# Patient Record
Sex: FEMALE
[De-identification: names, ages, dates, MRNs, and addresses within clinical notes are randomized per-mention and may not be internally consistent; named-entity substitution may affect disease eponyms.]

---

## 2021-04-21 ENCOUNTER — HOSPITAL ENCOUNTER (INPATIENT)
Dept: HOSPITAL 5 - ED | Age: 76
LOS: 5 days | Discharge: ANOTHER HOSPITAL | DRG: 375 | End: 2021-04-26
Attending: INTERNAL MEDICINE | Admitting: INTERNAL MEDICINE
Payer: MEDICARE

## 2021-04-21 DIAGNOSIS — I10: ICD-10-CM

## 2021-04-21 DIAGNOSIS — C79.00: ICD-10-CM

## 2021-04-21 DIAGNOSIS — Z79.01: ICD-10-CM

## 2021-04-21 DIAGNOSIS — Z90.49: ICD-10-CM

## 2021-04-21 DIAGNOSIS — Z82.49: ICD-10-CM

## 2021-04-21 DIAGNOSIS — F17.210: ICD-10-CM

## 2021-04-21 DIAGNOSIS — K29.00: ICD-10-CM

## 2021-04-21 DIAGNOSIS — Z79.891: ICD-10-CM

## 2021-04-21 DIAGNOSIS — K63.5: ICD-10-CM

## 2021-04-21 DIAGNOSIS — C18.4: Primary | ICD-10-CM

## 2021-04-21 DIAGNOSIS — E87.1: ICD-10-CM

## 2021-04-21 DIAGNOSIS — D75.1: ICD-10-CM

## 2021-04-21 DIAGNOSIS — E44.0: ICD-10-CM

## 2021-04-21 DIAGNOSIS — Z79.899: ICD-10-CM

## 2021-04-21 DIAGNOSIS — Z72.89: ICD-10-CM

## 2021-04-21 DIAGNOSIS — E03.9: ICD-10-CM

## 2021-04-21 LAB
ALBUMIN SERPL-MCNC: 3.8 G/DL (ref 3.9–5)
ALT SERPL-CCNC: 16 UNITS/L (ref 7–56)
BASOPHILS # (AUTO): 0.1 K/MM3 (ref 0–0.1)
BASOPHILS NFR BLD AUTO: 0.5 % (ref 0–1.8)
BUN SERPL-MCNC: 16 MG/DL (ref 7–17)
BUN/CREAT SERPL: 23 %
CALCIUM SERPL-MCNC: 8.8 MG/DL (ref 8.4–10.2)
EOSINOPHIL # BLD AUTO: 0 K/MM3 (ref 0–0.4)
EOSINOPHIL NFR BLD AUTO: 0.3 % (ref 0–4.3)
HCT VFR BLD CALC: 51.6 % (ref 30.3–42.9)
HEMOLYSIS INDEX: 1
HGB BLD-MCNC: 17.6 GM/DL (ref 10.1–14.3)
LYMPHOCYTES # BLD AUTO: 1.7 K/MM3 (ref 1.2–5.4)
LYMPHOCYTES NFR BLD AUTO: 11.4 % (ref 13.4–35)
MCHC RBC AUTO-ENTMCNC: 34 % (ref 30–34)
MCV RBC AUTO: 94 FL (ref 79–97)
MONOCYTES # (AUTO): 1 K/MM3 (ref 0–0.8)
MONOCYTES % (AUTO): 7 % (ref 0–7.3)
PLATELET # BLD: 307 K/MM3 (ref 140–440)
RBC # BLD AUTO: 5.49 M/MM3 (ref 3.65–5.03)

## 2021-04-21 PROCEDURE — 83690 ASSAY OF LIPASE: CPT

## 2021-04-21 PROCEDURE — 74177 CT ABD & PELVIS W/CONTRAST: CPT

## 2021-04-21 PROCEDURE — 83036 HEMOGLOBIN GLYCOSYLATED A1C: CPT

## 2021-04-21 PROCEDURE — G0378 HOSPITAL OBSERVATION PER HR: HCPCS

## 2021-04-21 PROCEDURE — 80048 BASIC METABOLIC PNL TOTAL CA: CPT

## 2021-04-21 PROCEDURE — 85025 COMPLETE CBC W/AUTO DIFF WBC: CPT

## 2021-04-21 PROCEDURE — 87086 URINE CULTURE/COLONY COUNT: CPT

## 2021-04-21 PROCEDURE — 36415 COLL VENOUS BLD VENIPUNCTURE: CPT

## 2021-04-21 PROCEDURE — 81001 URINALYSIS AUTO W/SCOPE: CPT

## 2021-04-21 PROCEDURE — 80053 COMPREHEN METABOLIC PANEL: CPT

## 2021-04-21 PROCEDURE — 93005 ELECTROCARDIOGRAM TRACING: CPT

## 2021-04-21 PROCEDURE — 74022 RADEX COMPL AQT ABD SERIES: CPT

## 2021-04-21 PROCEDURE — 83735 ASSAY OF MAGNESIUM: CPT

## 2021-04-21 PROCEDURE — 82140 ASSAY OF AMMONIA: CPT

## 2021-04-21 PROCEDURE — 88305 TISSUE EXAM BY PATHOLOGIST: CPT

## 2021-04-21 PROCEDURE — 74018 RADEX ABDOMEN 1 VIEW: CPT

## 2021-04-21 RX ADMIN — HEPARIN SODIUM SCH UNIT: 5000 INJECTION, SOLUTION INTRAVENOUS; SUBCUTANEOUS at 23:23

## 2021-04-21 NOTE — HISTORY AND PHYSICAL REPORT
History of Present Illness


Date of examination: 04/21/21


Date of admission: 


04/21/21 17:41





Chief complaint: 


Vomiting for 2 weeks





History of present illness: 


75-year-old  female comes into the emergency room for persistent 

vomiting of 2 weeks duration.  Patient also has abdominal pain.  Patient says 

that she is not able to keep anything down and cannot tolerate solids.  No fever

or chills.  Abdominal pain is about 6 on a scale of 1-10 and intermittent in 

nature.  Patient states that she has a bowel obstruction.  Patient says says she

did not have any bowel movement in the last few days.  Patient went to [facility] last week and was evaluated and discharged.  No significant past 

medical history.  Smokes about half a pack a day.  Patient states is mostly 

bilateral and dry heaving.  No food poisoning.  No history of small bowel 

obstruction.  Had surgery for ovarian abscess presumably many years ago


This is a 75-year-old  female presents to the emergency department with

a complaint of "I think I have a blockage."  The patient has been dealing with 

nausea, vomiting and abdominal pain over the past few weeks.  She says that she 

has lost about 10 to 20 pounds over that time as she is unable to eat anything 

without increased nausea with vomiting.  She says that if she does not have any 

food in her stomach then she just "retches."  Patient also says that she has not

had a normal or satisfactory bowel movement in about 1 week.  The patient went 

to [facility] last week for the same symptoms and says that she was

"sent home to follow-up with my doctor", Dr. Chanel in [place].  He recommended

that she try an enema, but the patient says that "it did nothing."  She denies 

any past medical history.  She is a tobacco smoker up until a few days ago.  She

denies any fever, dysuria, vaginal bleeding or discharge, rectal bleeding, 

diarrhea.








 Review of Systems 


ROS: 


Stated complaint: UNABLE TO HAVE BOWEL MOVEMENT


Other details as noted in HPI





Comment: All other systems reviewed and negative


Constitutional: denies: chills, fever


Eyes: denies: eye pain, vision change


ENT: denies: ear pain, throat pain


Respiratory: denies: cough, shortness of breath


Cardiovascular: denies: chest pain, palpitations


Endocrine: unexplained weight loss


Gastrointestinal: abdominal pain, nausea, vomiting, constipation


Genitourinary: denies: dysuria, discharge


Musculoskeletal: denies: back pain, arthralgia


Skin: denies: rash, lesions


Neurological: denies: headache, weakness











Past History


Past Medical History: No medical history


Past Surgical History: Other (Lower abdominal surgery for ovarian abscess)


Social history: lives with family, smoking (Half a pack a day.), full code


Family history: hypertension





Medications and Allergies


                                    Allergies











Allergy/AdvReac Type Severity Reaction Status Date / Time


 


No Known Allergies Allergy   Unverified 04/21/21 19:55














Exam





- Constitutional


Vitals: 


                                        











Temp Pulse Resp BP Pulse Ox


 


 98.4 F   65   14   150/75   97 


 


 04/21/21 20:00  04/21/21 20:30  04/21/21 20:30  04/21/21 20:30  04/21/21 20:30











General appearance: Present: mild distress, well-nourished





- EENT


Eyes: Present: PERRL


ENT: hearing intact, clear oral mucosa





- Neck


Neck: Present: supple, normal ROM





- Respiratory


Respiratory effort: normal


Respiratory: bilateral: CTA





- Cardiovascular


Heart rate: 98


Rhythm: regular (98)


Heart Sounds: Present: S1 & S2.  Absent: rub, click





- Extremities


Extremities: pulses symmetrical, No edema


Peripheral Pulses: within normal limits





- Abdominal


General gastrointestinal: Present: soft, tender, normal bowel sounds


Localized gastrointestinal: tender: diffuse (No guarding)


Female genitourinary: Present: normal





- Rectal


Rectal Exam: deferred





- Integumentary


Integumentary: Present: clear, warm, dry





- Musculoskeletal


Musculoskeletal: gait normal, strength equal bilaterally





- Psychiatric


Psychiatric: appropriate mood/affect, intact judgment & insight





- Neurologic


Neurologic: CNII-XII intact, moves all extremities





- Allied Health


Allied health notes reviewed: nursing, case management





Results





- Labs


CBC & Chem 7: 


                                 04/21/21 13:34





                                 04/21/21 13:34


Labs: 


                             Laboratory Last Values











WBC  14.6 K/mm3 (4.5-11.0)  H  04/21/21  13:34    


 


RBC  5.49 M/mm3 (3.65-5.03)  H  04/21/21  13:34    


 


Hgb  17.6 gm/dl (10.1-14.3)  H  04/21/21  13:34    


 


Hct  51.6 % (30.3-42.9)  H  04/21/21  13:34    


 


MCV  94 fl (79-97)   04/21/21  13:34    


 


MCH  32 pg (28-32)   04/21/21  13:34    


 


MCHC  34 % (30-34)   04/21/21  13:34    


 


RDW  13.9 % (13.2-15.2)   04/21/21  13:34    


 


Plt Count  307 K/mm3 (140-440)   04/21/21  13:34    


 


Lymph % (Auto)  11.4 % (13.4-35.0)  L  04/21/21  13:34    


 


Mono % (Auto)  7.0 % (0.0-7.3)   04/21/21  13:34    


 


Eos % (Auto)  0.3 % (0.0-4.3)   04/21/21  13:34    


 


Baso % (Auto)  0.5 % (0.0-1.8)   04/21/21  13:34    


 


Lymph # (Auto)  1.7 K/mm3 (1.2-5.4)   04/21/21  13:34    


 


Mono # (Auto)  1.0 K/mm3 (0.0-0.8)  H  04/21/21  13:34    


 


Eos # (Auto)  0.0 K/mm3 (0.0-0.4)   04/21/21  13:34    


 


Baso # (Auto)  0.1 K/mm3 (0.0-0.1)   04/21/21  13:34    


 


Seg Neutrophils %  80.8 % (40.0-70.0)  H  04/21/21  13:34    


 


Seg Neutrophils #  11.8 K/mm3 (1.8-7.7)  H  04/21/21  13:34    


 


Sodium  135 mmol/L (137-145)  L  04/21/21  13:34    


 


Potassium  3.6 mmol/L (3.6-5.0)   04/21/21  13:34    


 


Chloride  99.0 mmol/L ()   04/21/21  13:34    


 


Carbon Dioxide  20 mmol/L (22-30)  L  04/21/21  13:34    


 


Anion Gap  20 mmol/L  04/21/21  13:34    


 


BUN  16 mg/dL (7-17)   04/21/21  13:34    


 


Creatinine  0.7 mg/dL (0.6-1.2)   04/21/21  13:34    


 


Estimated GFR  > 60 ml/min  04/21/21  13:34    


 


BUN/Creatinine Ratio  23 %  04/21/21  13:34    


 


Glucose  116 mg/dL ()  H  04/21/21  13:34    


 


Lactic Acid  1.00 mmol/L (0.7-2.0)   04/21/21  16:30    


 


Calcium  8.8 mg/dL (8.4-10.2)   04/21/21  13:34    


 


Total Bilirubin  0.90 mg/dL (0.1-1.2)   04/21/21  13:34    


 


AST  17 units/L (5-40)   04/21/21  13:34    


 


ALT  16 units/L (7-56)   04/21/21  13:34    


 


Alkaline Phosphatase  63 units/L ()   04/21/21  13:34    


 


Total Protein  6.5 g/dL (6.3-8.2)   04/21/21  13:34    


 


Albumin  3.8 g/dL (3.9-5)  L  04/21/21  13:34    


 


Albumin/Globulin Ratio  1.4 %  04/21/21  13:34    


 


Lipase  11 units/L (13-60)  L  04/21/21  13:34    














- Imaging and Cardiology


CT scan - abdomen: report reviewed


Imaging and Cardiology: 


Abdominal CAT scan


STOMACH / SMALL BOWEL: No significant abnormality of the stomach or duodenum. 





Mildly dilated small bowel loops are seen beginning along the proximal/mid 

jejunum that continues to the ileocecal valve without a distinct transition 

point.





 No other significant abnormality is identified. 





COLON: There is sigmoid diverticulosis without evidence of diverticulitis. 

Moderate focal thickening of the distal third of the transverse colon is noted 

on image 56 of series 2 spanning a distance of 3.5 cm. There is nonspecific mild

distention of the cecum. 





No other significant abnormality is noted. APPENDIX: Not visualized. PERITONEUM:

No free fluid. No free air. No fluid collection. LYMPH NODES: No significant 

adenopathy. AORTA / ARTERIES: The aorta is normal in caliber with mild 

generalized atherosclerosis. IVC / VEINS: No significant abnormality. URINARY 

BLADDER: No significant abnormality. REPRODUCTIVE ORGANS: No significant 

abnormality. ADDITIONAL FINDINGS: None. SKELETAL SYSTEM: The bones are 

demineralized with mild degenerative changes throughout the spine. No acute 

abnormality








 IMPRESSION: 1. Nonspecific mild small bowel dilatation without a distinct 

transition point. Close clinical follow-up is recommended. 2. Suspicious focal 

thickening of the transverse colon as above.





 Malignancy cannot be excluded. Further evaluation with colonoscopy when the 

patient's clinical condition allows is recommended. 3. Additional findings as 

above. Signer Name: Cliff Rosas MD Signed: 4/21/2021 3:23 PM Workstation Name:

[id number] 





Chest x-ray


IMPRESSION: Abnormal bowel gas pattern with gaseous distention of both small and

large bowel. There is questionable colonic wall edema raising suspicion for 

colitis. I would suggest further evaluation with CT abdomen/pelvis, if possible.

Signer Name: Sary Owusu MD Signed: 4/21/2021 1:36 PM Workstation Name: 

[id number] 





Assessment and Plan


Advance Directives: Yes (Full code)


VTE prophylaxis?: Chemical


Plan of care discussed with patient/family: Yes





- Patient Problems


(1) Acute gastroenteritis


Current Visit: Yes   Status: Acute   


Plan to address problem: 


Small bowel loops are dilated


No signs of obstruction


We will keep the patient n.p.o. and advance to clear liquids after surgery 

clears


IV fluids for now


Pain management for now


Etiology unclear


IV Zosyn started empirically








(2) Polycythemia due to fall in plasma volume


Current Visit: Yes   Status: Acute   


Plan to address problem: 


IV Fluids for now 








(3) Colon wall thickening


Current Visit: Yes   Status: Acute   


Plan to address problem: 


Transverse colon is thickened


Malignancy to be ruled out


GI consult requested








(4) Hyponatremia


Current Visit: Yes   Status: Acute   


Plan to address problem: 


Mild\


IV fluids for now








(5) Malnutrition


Current Visit: Yes   Status: Chronic   


Qualifiers: 


   Protein-calorie malnutrition severity: mild 


Plan to address problem: 


Dietary supplements once she starts eating








(6) DVT prophylaxis


Current Visit: Yes   Status: Acute   


Plan to address problem: 


On heparin and GI prophylaxis

## 2021-04-21 NOTE — XRAY REPORT
ABDOMEN 2 VIEW WITH PA CHEST



INDICATION / CLINICAL INFORMATION:

Abd pain.



COMPARISON:

None available.



FINDINGS:

Chest: PA view of the chest shows normal cardiomediastinal silhouette and pulmonary vascularity. Both
 lungs are well-expanded and clear.



Supine and erect views of the abdomen demonstrate mild gaseous distention of both small and large bow
el. Stool and gas are present within the rectum. No visible free air.



There is suggestion of possible mild colonic wall thickening mostly identified in the ascending and t
ransverse colon raising possibility for colitis.



IMPRESSION: Abnormal bowel gas pattern with gaseous distention of both small and large bowel. There i
s questionable colonic wall edema raising suspicion for colitis. I would suggest further evaluation w
ith CT abdomen/pelvis, if possible.



Signer Name: Sary Owusu MD 

Signed: 4/21/2021 1:36 PM

Workstation Name: [id number]

## 2021-04-21 NOTE — EMERGENCY DEPARTMENT REPORT
HPI





<PUNEET FISHER - Last Filed: 04/21/21 18:06>





- [facility]


HPI: 





This is a 75-year-old  female presents to the emergency department with

a complaint of "I think I have a blockage."  The patient has been dealing with 

nausea, vomiting and abdominal pain over the past few weeks.  She says that she 

has lost about 10 to 20 pounds over that time as she is unable to eat anything 

without increased nausea with vomiting.  She says that if she does not have any 

food in her stomach then she just "retches."  Patient also says that she has not

had a normal or satisfactory bowel movement in about 1 week.  The patient went 

to [facility] last week for the same symptoms and says that she was

"sent home to follow-up with my doctor", Dr. Chanel in [place].  He recommended

that she try an enema, but the patient says that "it did nothing."  She denies 

any past medical history.  She is a tobacco smoker up until a few days ago.  She

denies any fever, dysuria, vaginal bleeding or discharge, rectal bleeding, 

diarrhea.





<VERA PRYOR - Last Filed: 04/24/21 10:00>





- General


Time Seen by Provider: 04/21/21 12:45





ED Past Medical Hx





<PUNEET FISHER - Last Filed: 04/21/21 18:06>





<VERA PRYOR - Last Filed: 04/24/21 10:00>





- Medications


Home Medications: 


                                Home Medications











 Medication  Instructions  Recorded  Confirmed  Last Taken  Type


 


Levothyroxine [Synthroid] 50 mcg PO QAM 04/22/21 04/22/21 2 Weeks Ago History





    ~04/08/21 





    50 mcg 














ED Review of Systems


ROS: 


Stated complaint: UNABLE TO HAVE BOWEL MOVEMENT


Other details as noted in HPI








<PUNEET FISHER - Last Filed: 04/21/21 18:06>


ROS: 


Stated complaint: UNABLE TO HAVE BOWEL MOVEMENT


Other details as noted in HPI





Comment: All other systems reviewed and negative


Constitutional: denies: chills, fever


Eyes: denies: eye pain, vision change


ENT: denies: ear pain, throat pain


Respiratory: denies: cough, shortness of breath


Cardiovascular: denies: chest pain, palpitations


Endocrine: unexplained weight loss


Gastrointestinal: abdominal pain, nausea, vomiting, constipation


Genitourinary: denies: dysuria, discharge


Musculoskeletal: denies: back pain, arthralgia


Skin: denies: rash, lesions


Neurological: denies: headache, weakness





<VERA PRYOR - Last Filed: 04/24/21 10:00>





Physical Exam





- Physical Exam


Vital Signs: 


                                   Vital Signs











  04/21/21 04/21/21





  12:42 13:09


 


Temperature 97.8 F 


 


Pulse Rate 88 


 


Respiratory 22 18





Rate  


 


Blood Pressure 179/98 





[Left]  


 


O2 Sat by Pulse 98 





Oximetry  














<PUNEET FISHER - Last Filed: 04/21/21 18:06>





- Physical Exam


Vital Signs: 


                                   Vital Signs











  04/21/21





  12:42


 


Temperature 97.8 F


 


Pulse Rate 88


 


Respiratory 22





Rate 


 


Blood Pressure 179/98





[Left] 


 


O2 Sat by Pulse 98





Oximetry 











Physical Exam: 





GENERAL: The patient is well-developed well-nourished.


HENT: Normocephalic.  Atraumatic.    Patient has moist mucous membranes.


EYES: Extraocular motions are intact. 


NECK: Supple. Trachea is midline.


CHEST/LUNGS: Clear to auscultation.  There is no respiratory distress noted.


HEART/CARDIOVASCULAR: Regular.  There is no tachycardia.  There is no murmur.


ABDOMEN: Abdomen is soft.  The patient has abdominal tenderness to palpation all

 quadrants except for her left upper quadrant.  No guarding.  Patient has normal

 bowel sounds.  


SKIN: Skin is warm and dry. 


NEURO: The patient is awake, alert, and oriented.  The patient is cooperative.  

The patient has no focal neurologic deficits.  Normal speech.


MUSCULOSKELETAL: There is no tenderness or deformity.  There is no limitation 

range of motion.  





<VERA PRYOR - Last Filed: 04/24/21 10:00>





ED Course


                                   Vital Signs











  04/21/21 04/21/21





  12:42 13:09


 


Temperature 97.8 F 


 


Pulse Rate 88 


 


Respiratory 22 18





Rate  


 


Blood Pressure 179/98 





[Left]  


 


O2 Sat by Pulse 98 





Oximetry  














<PUNEET FISHER - Last Filed: 04/21/21 18:06>


                                   Vital Signs











  04/21/21





  12:42


 


Temperature 97.8 F


 


Pulse Rate 88


 


Respiratory 22





Rate 


 


Blood Pressure 179/98





[Left] 


 


O2 Sat by Pulse 98





Oximetry 














<VERA PRYOR - Last Filed: 04/24/21 10:00>





ED Medical Decision Making





- Lab Data


Result diagrams: 


                                 04/21/21 13:34





                                 04/21/21 13:34





- Medical Decision Making





Patient is 75-year-old female signed out to me by my colleague Dr. Pryor.  

Patient presented to the ER complaining of diffuse abdominal pain for 

approximately 1week.  Labs reviewed and showed elevated white blood cells of 14.

  Patient received Zosyn.  CT abdomen and pelvis with IV contrast showed dilated

 small bowel loops in also thickening in the sigmoid concerning for colitis and 

also concern for malignancy.  I discussed the patient with Dr. Lovelace, surgeon 

on call and he advised that she will follow-up with the patient.  I discussed 

the patient with Dr. Abdi, he agreed to admit the patient to medical service for

 further management.











<PUNEET FISHER. - Last Filed: 04/21/21 18:06>





- Lab Data


Result diagrams: 


                                 04/23/21 05:15





                                 04/23/21 05:15





                                   Lab Results











  04/21/21 04/21/21 Range/Units





  13:34 13:34 


 


WBC  14.6 H   (4.5-11.0)  K/mm3


 


RBC  5.49 H   (3.65-5.03)  M/mm3


 


Hgb  17.6 H   (10.1-14.3)  gm/dl


 


Hct  51.6 H   (30.3-42.9)  %


 


MCV  94   (79-97)  fl


 


MCH  32   (28-32)  pg


 


MCHC  34   (30-34)  %


 


RDW  13.9   (13.2-15.2)  %


 


Plt Count  307   (140-440)  K/mm3


 


Lymph % (Auto)  11.4 L   (13.4-35.0)  %


 


Mono % (Auto)  7.0   (0.0-7.3)  %


 


Eos % (Auto)  0.3   (0.0-4.3)  %


 


Baso % (Auto)  0.5   (0.0-1.8)  %


 


Lymph # (Auto)  1.7   (1.2-5.4)  K/mm3


 


Mono # (Auto)  1.0 H   (0.0-0.8)  K/mm3


 


Eos # (Auto)  0.0   (0.0-0.4)  K/mm3


 


Baso # (Auto)  0.1   (0.0-0.1)  K/mm3


 


Seg Neutrophils %  80.8 H   (40.0-70.0)  %


 


Seg Neutrophils #  11.8 H   (1.8-7.7)  K/mm3


 


Sodium   135 L  (137-145)  mmol/L


 


Potassium   3.6  (3.6-5.0)  mmol/L


 


Chloride   99.0  ()  mmol/L


 


Carbon Dioxide   20 L  (22-30)  mmol/L


 


Anion Gap   20  mmol/L


 


BUN   16  (7-17)  mg/dL


 


Creatinine   0.7  (0.6-1.2)  mg/dL


 


Estimated GFR   > 60  ml/min


 


BUN/Creatinine Ratio   23  %


 


Glucose   116 H  ()  mg/dL


 


Calcium   8.8  (8.4-10.2)  mg/dL


 


Total Bilirubin   0.90  (0.1-1.2)  mg/dL


 


AST   17  (5-40)  units/L


 


ALT   16  (7-56)  units/L


 


Alkaline Phosphatase   63  ()  units/L


 


Total Protein   6.5  (6.3-8.2)  g/dL


 


Albumin   3.8 L  (3.9-5)  g/dL


 


Albumin/Globulin Ratio   1.4  %


 


Lipase   11 L  (13-60)  units/L














- Radiology Data


Radiology results: report reviewed





CT ABDOMEN AND PELVIS WITH CONTRAST INDICATION / CLINICAL INFORMATION: 

Unspecified abdominal pain, prior abnormal acute abdomen series. TECHNIQUE: 

Axial CT images were obtained through the abdomen and pelvis after 100 cc 

Omnipaque 300 IV contrast. All CT scans at this location are performed using CT 

dose reduction for ALARA by means of automated exposure control. COMPARISON: 

Abdominal series performed earlier today. FINDINGS: LOWER CHEST: No significant 

abnormality. LIVER: No significant abnormality. GALLBLADDER: Surgically absent. 

BILE DUCTS: Prominence of the bile ducts is compatible with the patient's age 

and prior cholecystectomy. No acute abnormality. PANCREAS: No significant 

abnormality. SPLEEN: No significant abnormality. ADRENALS: No significant 

abnormality. RIGHT KIDNEY / URETER: No significant abnormality. LEFT KIDNEY / 

URETER: No significant abnormality. STOMACH / SMALL BOWEL: No significant 

abnormality of the stomach or duodenum. Mildly dilated small bowel loops are 

seen beginning along the proximal/mid jejunum that continues to the ileocecal 

valve without a distinct transition point. No other significant abnormality is 

identified. COLON: There is sigmoid diverticulosis without evidence of 

diverticulitis. Moderate focal thickening of the distal third of the transverse 

colon is noted on image 56 of series 2 spanning a distance of 3.5 cm. There is 

nonspecific mild distention of the cecum. No other significant abnormality is 

noted. APPENDIX: Not visualized. PERITONEUM: No free fluid. No free air. No flui

d collection. LYMPH NODES: No significant adenopathy. AORTA / ARTERIES: The 

aorta is normal in caliber with mild generalized atherosclerosis. IVC / VEINS: 

No significant abnormality. URINARY BLADDER: No significant abnormality. 

REPRODUCTIVE ORGANS: No significant abnormality. ADDITIONAL FINDINGS: None. 

SKELETAL SYSTEM: The bones are demineralized with mild degenerative changes 

throughout the spine. No acute abnormality. IMPRESSION: 1. Nonspecific mild 

small bowel dilatation without a distinct transition point. Close clinical 

follow-up is recommended. 2. Suspicious focal thickening of the transverse colon

 as above. Malignancy cannot be excluded. Further evaluation with colonoscopy 

when the patient's clinical condition allows is recommended. 3. Additional 

findings as above. 





<VERA PRYOR S - Last Filed: 04/24/21 10:00>


Critical care attestation.: 


If time is entered above; I have spent that time in minutes in the direct care 

of this critically ill patient, excluding procedure time.








<PUNEET FISHER. - Last Filed: 04/21/21 18:06>


Critical Care Time: No


Critical care attestation.: 


If time is entered above; I have spent that time in minutes in the direct care 

of this critically ill patient, excluding procedure time.








<VERA PRYOR S - Last Filed: 04/24/21 10:00>





ED Disposition


Is pt being admited?: Yes





<PUNEET FISHER. - Last Filed: 04/21/21 18:06>


Is pt being admited?: Yes





<VERA PRYOR S - Last Filed: 04/24/21 10:00>


Clinical Impression: 


 Acute colitis





Abdominal pain


Qualifiers:


 Abdominal location: unspecified location Qualified Code(s): R10.9 - Unspecified

 abdominal pain





Disposition: DC-[id number] OP ADMIT IP TO THIS HOSP


Condition: Stable

## 2021-04-21 NOTE — CAT SCAN REPORT
CT ABDOMEN AND PELVIS WITH CONTRAST



INDICATION / CLINICAL INFORMATION:

Unspecified abdominal pain, prior abnormal acute abdomen series.



TECHNIQUE:

Axial CT images were obtained through the abdomen and pelvis after 100 cc Omnipaque 300 IV contrast. 
 All CT scans at this location are performed using CT dose reduction for ALARA by means of automated 
exposure control. 



COMPARISON:

Abdominal series performed earlier today.



FINDINGS:



LOWER CHEST: No significant abnormality.

LIVER: No significant abnormality.

GALLBLADDER: Surgically absent.  

BILE DUCTS: Prominence of the bile ducts is compatible with the patient's age and prior cholecystecto
my. No acute abnormality.

PANCREAS: No significant abnormality.

SPLEEN: No significant abnormality.

ADRENALS: No significant abnormality.

RIGHT KIDNEY / URETER: No significant abnormality.

LEFT KIDNEY / URETER: No significant abnormality.



STOMACH / SMALL BOWEL: No significant abnormality of the stomach or duodenum. Mildly dilated small lynette
wel loops are seen beginning along the proximal/mid jejunum that continues to the ileocecal valve wit
hout a distinct transition point. No other significant abnormality is identified.

COLON: There is sigmoid diverticulosis without evidence of diverticulitis. Moderate focal thickening 
of the distal third of the transverse colon is noted on image 56 of series 2 spanning a distance of 3
.5 cm. There is nonspecific mild distention of the cecum. No other significant abnormality is noted.

APPENDIX: Not visualized.  

PERITONEUM: No free fluid. No free air. No fluid collection.

LYMPH NODES: No significant adenopathy.

AORTA / ARTERIES: The aorta is normal in caliber with mild generalized atherosclerosis. 

IVC / VEINS: No significant abnormality.



URINARY BLADDER: No significant abnormality.

REPRODUCTIVE ORGANS: No significant abnormality.



ADDITIONAL FINDINGS: None.



SKELETAL SYSTEM: The bones are demineralized with mild degenerative changes throughout the spine. No 
acute abnormality.



IMPRESSION:

1. Nonspecific mild small bowel dilatation without a distinct transition point. Close clinical follow
-up is recommended.

2. Suspicious focal thickening of the transverse colon as above. Malignancy cannot be excluded. Furth
er evaluation with colonoscopy when the patient's clinical condition allows is recommended.

3. Additional findings as above.



Signer Name: Cliff Rosas MD 

Signed: 4/21/2021 3:23 PM

Workstation Name: [id number]

## 2021-04-22 LAB
ALBUMIN SERPL-MCNC: 3.3 G/DL (ref 3.9–5)
ALT SERPL-CCNC: 11 UNITS/L (ref 7–56)
BASOPHILS # (AUTO): 0.1 K/MM3 (ref 0–0.1)
BASOPHILS NFR BLD AUTO: 0.6 % (ref 0–1.8)
BILIRUB UR QL STRIP: (no result)
BLOOD UR QL VISUAL: (no result)
BUN SERPL-MCNC: 11 MG/DL (ref 7–17)
BUN/CREAT SERPL: 18 %
CALCIUM SERPL-MCNC: 8.4 MG/DL (ref 8.4–10.2)
EOSINOPHIL # BLD AUTO: 0.1 K/MM3 (ref 0–0.4)
EOSINOPHIL NFR BLD AUTO: 0.9 % (ref 0–4.3)
HCT VFR BLD CALC: 46.7 % (ref 30.3–42.9)
HEMOLYSIS INDEX: 2
HGB BLD-MCNC: 16 GM/DL (ref 10.1–14.3)
LYMPHOCYTES # BLD AUTO: 1.4 K/MM3 (ref 1.2–5.4)
LYMPHOCYTES NFR BLD AUTO: 11.1 % (ref 13.4–35)
MCHC RBC AUTO-ENTMCNC: 34 % (ref 30–34)
MCV RBC AUTO: 92 FL (ref 79–97)
MONOCYTES # (AUTO): 1 K/MM3 (ref 0–0.8)
MONOCYTES % (AUTO): 7.8 % (ref 0–7.3)
MUCOUS THREADS #/AREA URNS HPF: (no result) /HPF
PH UR STRIP: 5 [PH] (ref 5–7)
PLATELET # BLD: 278 K/MM3 (ref 140–440)
RBC # BLD AUTO: 5.07 M/MM3 (ref 3.65–5.03)
RBC #/AREA URNS HPF: 3 /HPF (ref 0–6)
UROBILINOGEN UR-MCNC: 4 MG/DL (ref ?–2)
WBC #/AREA URNS HPF: 9 /HPF (ref 0–6)

## 2021-04-22 RX ADMIN — ONDANSETRON PRN MG: 2 INJECTION INTRAMUSCULAR; INTRAVENOUS at 20:43

## 2021-04-22 RX ADMIN — ONDANSETRON PRN MG: 2 INJECTION INTRAMUSCULAR; INTRAVENOUS at 11:32

## 2021-04-22 RX ADMIN — Medication SCH ML: at 00:47

## 2021-04-22 RX ADMIN — FAMOTIDINE SCH: 10 INJECTION, SOLUTION INTRAVENOUS at 10:59

## 2021-04-22 RX ADMIN — FAMOTIDINE SCH MG: 10 INJECTION, SOLUTION INTRAVENOUS at 22:40

## 2021-04-22 RX ADMIN — HEPARIN SODIUM SCH UNIT: 5000 INJECTION, SOLUTION INTRAVENOUS; SUBCUTANEOUS at 22:00

## 2021-04-22 RX ADMIN — PIPERACILLIN AND TAZOBACTAM SCH MLS/HR: 4; .5 INJECTION, POWDER, FOR SOLUTION INTRAVENOUS at 07:44

## 2021-04-22 RX ADMIN — FAMOTIDINE SCH MG: 10 INJECTION, SOLUTION INTRAVENOUS at 00:46

## 2021-04-22 RX ADMIN — Medication SCH: at 22:00

## 2021-04-22 RX ADMIN — HEPARIN SODIUM SCH UNIT: 5000 INJECTION, SOLUTION INTRAVENOUS; SUBCUTANEOUS at 07:59

## 2021-04-22 RX ADMIN — FAMOTIDINE SCH MG: 10 INJECTION, SOLUTION INTRAVENOUS at 07:59

## 2021-04-22 RX ADMIN — PIPERACILLIN AND TAZOBACTAM SCH MLS/HR: 4; .5 INJECTION, POWDER, FOR SOLUTION INTRAVENOUS at 17:52

## 2021-04-22 RX ADMIN — Medication SCH: at 11:01

## 2021-04-22 RX ADMIN — HEPARIN SODIUM SCH: 5000 INJECTION, SOLUTION INTRAVENOUS; SUBCUTANEOUS at 11:00

## 2021-04-22 NOTE — ELECTROCARDIOGRAPH REPORT
[facility]

                                       

Test Date:    2021               Test Time:    16:27:37

Pat Name:     RACHEL SEARS           Department:   

Patient ID:   [id number]          Room:         B318 1

Gender:       F                        Technician:   SC

:          1945               Requested By: VERA NORWOOD

Order Number: [id number]              Reading MD:   Rai Leonard

                                 Measurements

Intervals                              Axis          

Rate:         65                       P:            58

MD:           174                      QRS:          12

QRSD:         80                       T:            76

QT:                                                  

QTc:          0                                      

                           Interpretive Statements

Sinus rhythm

Low voltage, precordial leads

No previous ECG available for comparison

Electronically Signed On 2021 9:37:42 EDT by Rai Leonard

## 2021-04-22 NOTE — CONSULTATION
History of Present Illness


Consult date: 04/22/21


Reason for consult: abdominal pain


Chief complaint: 





abdominal pain





- History of present illness


History of present illness: 





76 yo F with hx of hypothyroidism who presents to ER with 2-3 weeks of worsening

abdominal pain, crampy in nature, located in the lower abdomen. The patient 

states she got the [employer] COVID vaccine April 3rd. 10 days later she notes 

spasm/crampy abdominal pain that came in waves. The pain was not associated with

food or an activity. She has never had pain like this before. This was 

associated with intermittent nausea and retching with then progressed to 

vomiting yellowish/brown fluid. She states she has had an extremely poor 

appetite. She has not had a BM for 2 weeks and has not been passing flatus. No 

f/c. She was seen at [facility] ER several days ago and had a CT scan. She 

states she was discharged from the ER. She followed up with her PCP several 

times. She was advised to take a fleet enema which she did. It did not help. She

continued to have pain and therefore was advised to return to ER. She has never 

had a colonoscopy.





Past History


Past Medical History: hypothyroidism


Past Surgical History: Other (Lower abdominal surgery for ovarian abscess, open 

cholecystectomy, ectopic pregnancy)


Social history: lives with family, smoking (Half a pack a day.), full code


Family history: hypertension





Medications and Allergies


                                    Allergies











Allergy/AdvReac Type Severity Reaction Status Date / Time


 


No Known Allergies Allergy   Unverified 04/21/21 19:55











Active Meds: 


Active Medications





Acetaminophen (Acetaminophen 325 Mg Tab)  650 mg PO Q4H PRN


   PRN Reason: Pain MILD(1-3)/Fever >100.5/HA


Famotidine (Famotidine 20 Mg/2 Ml Inj)  20 mg IV BID [facility]


   Last Admin: 04/22/21 10:59 Dose:  Not Given


   Documented by: 


Heparin Sodium (Porcine) (Heparin 5,000 Unit/1 Ml Vial)  5,000 unit SUB-Q Q12HR 

[facility]


   Last Admin: 04/22/21 11:00 Dose:  Not Given


   Documented by: 


Hydromorphone HCl (Hydromorphone 1 Mg/1 Ml Inj)  0.5 mg IV Q3H PRN


   PRN Reason: Pain , Severe (7-10)


Dextrose/Sodium Chloride (D5ns)  1,000 mls @ 125 mls/hr IV AS DIRECT DUSTY


   Last Admin: 04/22/21 00:46 Dose:  125 mls/hr


   Documented by: 


Piperacillin Sod/Tazobactam Sod (Zosyn/Ns 4.5gm/100ml)  4.5 gm in 100 mls @ 200 

mls/hr IV Q8HR DUSTY; Protocol


   Last Admin: 04/22/21 07:44 Dose:  200 mls/hr


   Documented by: 


Morphine Sulfate (Morphine 2 Mg/1 Ml Inj)  2 mg IV Q4H PRN


   PRN Reason: Pain, Moderate (4-6)


Ondansetron HCl (Ondansetron 4 Mg/2 Ml Inj)  4 mg IV Q8H PRN


   PRN Reason: Nausea And Vomiting


   Last Admin: 04/22/21 11:32 Dose:  4 mg


   Documented by: 


Sodium Chloride (Sodium Chloride 0.9% 10 Ml Flush Syringe)  10 ml IV BID DUSTY


   Last Admin: 04/22/21 11:01 Dose:  Not Given


   Documented by: 


Sodium Chloride (Sodium Chloride 0.9% 10 Ml Flush Syringe)  10 ml IV PRN PRN


   PRN Reason: LINE FLUSH











Review of Systems


All systems: negative (10 pt ROS performed and neg except for that listed in 

HPI)





Exam


                                   Vital Signs











Temp Pulse Resp BP Pulse Ox


 


 97.8 F   88   22   179/98   98 


 


 04/21/21 12:42  04/21/21 12:42  04/21/21 12:42  04/21/21 12:42  04/21/21 12:42











Narrative exam: 





Gen: AAOx3. NAD


ENT: no scleral icterus or conjunctival pallor


CV: S1, S2+


Resp: even and unlabored


Abd: soft, ND, mild diffuse TTP. No r/r/g. Well healed surgical scars


Ext: no c/c/e





Results





- Labs





                                 04/22/21 07:09





                                 04/22/21 07:09


                              Abnormal lab results











  04/21/21 04/21/21 04/22/21 Range/Units





  13:34 13:34 00:10 


 


WBC  14.6 H    (4.5-11.0)  K/mm3


 


RBC  5.49 H    (3.65-5.03)  M/mm3


 


Hgb  17.6 H    (10.1-14.3)  gm/dl


 


Hct  51.6 H    (30.3-42.9)  %


 


Lymph % (Auto)  11.4 L    (13.4-35.0)  %


 


Mono % (Auto)     (0.0-7.3)  %


 


Mono # (Auto)  1.0 H    (0.0-0.8)  K/mm3


 


Seg Neutrophils %  80.8 H    (40.0-70.0)  %


 


Seg Neutrophils #  11.8 H    (1.8-7.7)  K/mm3


 


Sodium   135 L   (137-145)  mmol/L


 


Potassium     (3.6-5.0)  mmol/L


 


Carbon Dioxide   20 L   (22-30)  mmol/L


 


Glucose   116 H   ()  mg/dL


 


Total Protein     (6.3-8.2)  g/dL


 


Albumin   3.8 L   (3.9-5)  g/dL


 


Lipase   11 L   (13-60)  units/L


 


Ur Specific Gravity    1.060 H  (1.003-1.030)  


 


Urine WBC (Auto)    9.0 H  (0.0-6.0)  /HPF














  04/22/21 04/22/21 Range/Units





  07:09 07:09 


 


WBC  12.2 H   (4.5-11.0)  K/mm3


 


RBC  5.07 H   (3.65-5.03)  M/mm3


 


Hgb  16.0 H   (10.1-14.3)  gm/dl


 


Hct  46.7 H   (30.3-42.9)  %


 


Lymph % (Auto)  11.1 L   (13.4-35.0)  %


 


Mono % (Auto)  7.8 H   (0.0-7.3)  %


 


Mono # (Auto)  1.0 H   (0.0-0.8)  K/mm3


 


Seg Neutrophils %  79.6 H   (40.0-70.0)  %


 


Seg Neutrophils #  9.7 H   (1.8-7.7)  K/mm3


 


Sodium   134 L  (137-145)  mmol/L


 


Potassium   3.2 L  (3.6-5.0)  mmol/L


 


Carbon Dioxide    (22-30)  mmol/L


 


Glucose   134 H  ()  mg/dL


 


Total Protein   5.3 L  (6.3-8.2)  g/dL


 


Albumin   3.3 L  (3.9-5)  g/dL


 


Lipase    (13-60)  units/L


 


Ur Specific Gravity    (1.003-1.030)  


 


Urine WBC (Auto)    (0.0-6.0)  /HPF








                                 Diabetes panel











  04/21/21 04/22/21 04/22/21 Range/Units





  13:34 07:09 07:09 


 


Sodium  135 L  134 L   (137-145)  mmol/L


 


Potassium  3.6  3.2 L   (3.6-5.0)  mmol/L


 


Chloride  99.0  102.1   ()  mmol/L


 


Carbon Dioxide  20 L  22   (22-30)  mmol/L


 


BUN  16  11   (7-17)  mg/dL


 


Creatinine  0.7  0.6   (0.6-1.2)  mg/dL


 


Glucose  116 H  134 H   ()  mg/dL


 


Hemoglobin A1c    5.2  (4-6)  %


 


Calcium  8.8  8.4   (8.4-10.2)  mg/dL


 


AST  17  11   (5-40)  units/L


 


ALT  16  11   (7-56)  units/L


 


Alkaline Phosphatase  63  54   ()  units/L


 


Total Protein  6.5  5.3 L   (6.3-8.2)  g/dL


 


Albumin  3.8 L  3.3 L   (3.9-5)  g/dL








                                  Calcium panel











  04/21/21 04/22/21 Range/Units





  13:34 07:09 


 


Calcium  8.8  8.4  (8.4-10.2)  mg/dL


 


Albumin  3.8 L  3.3 L  (3.9-5)  g/dL








                                 Pituitary panel











  04/21/21 04/22/21 Range/Units





  13:34 07:09 


 


Sodium  135 L  134 L  (137-145)  mmol/L


 


Potassium  3.6  3.2 L  (3.6-5.0)  mmol/L


 


Chloride  99.0  102.1  ()  mmol/L


 


Carbon Dioxide  20 L  22  (22-30)  mmol/L


 


BUN  16  11  (7-17)  mg/dL


 


Creatinine  0.7  0.6  (0.6-1.2)  mg/dL


 


Glucose  116 H  134 H  ()  mg/dL


 


Calcium  8.8  8.4  (8.4-10.2)  mg/dL








                                  Adrenal panel











  04/21/21 04/22/21 Range/Units





  13:34 07:09 


 


Sodium  135 L  134 L  (137-145)  mmol/L


 


Potassium  3.6  3.2 L  (3.6-5.0)  mmol/L


 


Chloride  99.0  102.1  ()  mmol/L


 


Carbon Dioxide  20 L  22  (22-30)  mmol/L


 


BUN  16  11  (7-17)  mg/dL


 


Creatinine  0.7  0.6  (0.6-1.2)  mg/dL


 


Glucose  116 H  134 H  ()  mg/dL


 


Calcium  8.8  8.4  (8.4-10.2)  mg/dL


 


Total Bilirubin  0.90  0.80  (0.1-1.2)  mg/dL


 


AST  17  11  (5-40)  units/L


 


ALT  16  11  (7-56)  units/L


 


Alkaline Phosphatase  63  54  ()  units/L


 


Total Protein  6.5  5.3 L  (6.3-8.2)  g/dL


 


Albumin  3.8 L  3.3 L  (3.9-5)  g/dL














- Imaging


CT scan - abdomen: report reviewed, image reviewed


CT scan - pelvis: report reviewed, image reviewed





Assessment and Plan





76 yo F with 





1. enterocolitis


2. possible transverse colon mass





Pt stable. Diffuse small bowel and cecal dilatation 2/2 enterocolitis and 

possible mass in transverse colon causing partial obstruction





Abd xray - diffuse dilatation of small bowel and right colon





Plan:


1. NPO except ice chips


2. IVF


3. prn pain and nausea control


4. DVT ppx


5. OOB/ambulate


6. GI consulted - may need cscope to evaluate distal transverse colon. Will 

follow up on recs


7. continue empiric abx





Thank you, please call with questions

## 2021-04-22 NOTE — PROGRESS NOTE
Assessment and Plan


Assessment and plan: 


--Hypokalemia;


Current Visit: Yes   Status: Acute, 


KCl IV 40 mEq , check magnesium


Closely monitor electrolytes





-- Acute gastroenteritis


Current Visit: Yes   Status: Acute, 


Plan to address problem: 


Small bowel loops are dilated


No signs of obstruction, N.p.o., IV fluids 


Pain medications supportive care


IV Zosyn started empirically


Surgery consulted





CT abdomen and pelvis ;


nonspecific mild small bowel dilation without transition point 


suspicious focal thickening of the transverse colon malignancy cannot be 

excluded 





Abdominal x-ray;


Similar findings of probable enterocolitis


Radiographic follow-up to resolution needed





--Polycythemia due to hemoconcentration


Current Visit: Yes   Status: Acute   


Plan to address problem: 


IV Fluids , closely monitor





-- Colon wall thickening


Current Visit: Yes   Status: Acute   


Plan to address problem: 


Transverse colon is thickened


Malignancy to be ruled out


GI consult requested





-- Hyponatremia


Current Visit: Yes   Status: Acute   


Plan to address problem: 


Mild\ IV fluids for now





--Malnutrition


Current Visit: Yes   Status: Chronic   


Plan to address problem: 


Dietary supplements once she starts eating





-- DVT prophylaxis


Current Visit: Yes   Status: Acute   


Plan to address problem: 


On heparin and GI prophylaxis





Follow surgery and GI evaluation and recommendations


Closely monitor the patient and adjust the management as needed


Plan of care reviewed with the patient and her nurse





History


Interval history: 


I have seen and examined the patient at the bedside


Patient's chart and medications reviewed


Patient complains of 2 weeks of constipation


Nausea vomiting


Vague abdominal pain, n.p.o. status


Vital signs noted








Hospitalist Physical





- Constitutional


Vitals: 


                                        











Temp Pulse Resp BP Pulse Ox


 


 97.8 F   69   20   146/77   97 


 


 04/22/21 07:25  04/22/21 07:25  04/22/21 07:25  04/22/21 07:25  04/22/21 07:25











General appearance: Present: mild distress, well-nourished





- EENT


Eyes: Present: PERRL, EOM intact





- Neck


Neck: Present: supple, normal ROM





- Respiratory


Respiratory effort: normal


Respiratory: bilateral: diminished, negative: rales, rhonchi, wheezing





- Cardiovascular


Rhythm: regular


Heart Sounds: Present: S1 & S2





- Extremities


Extremities: no ischemia, No edema





- Abdominal


General gastrointestinal: soft, tender (No guarding no rigidity), non-distended





- Integumentary


Integumentary: Present: clear, warm





- Psychiatric


Psychiatric: appropriate mood/affect, cooperative





- Neurologic


Neurologic: CNII-XII intact





Results





- Labs


CBC & Chem 7: 


                                 04/22/21 07:09





                                 04/22/21 07:09


Labs: 


                             Laboratory Last Values











WBC  12.2 K/mm3 (4.5-11.0)  H  04/22/21  07:09    


 


RBC  5.07 M/mm3 (3.65-5.03)  H  04/22/21  07:09    


 


Hgb  16.0 gm/dl (10.1-14.3)  H  04/22/21  07:09    


 


Hct  46.7 % (30.3-42.9)  H  04/22/21  07:09    


 


MCV  92 fl (79-97)   04/22/21  07:09    


 


MCH  32 pg (28-32)   04/22/21  07:09    


 


MCHC  34 % (30-34)   04/22/21  07:09    


 


RDW  13.9 % (13.2-15.2)   04/22/21  07:09    


 


Plt Count  278 K/mm3 (140-440)   04/22/21  07:09    


 


Lymph % (Auto)  11.1 % (13.4-35.0)  L  04/22/21  07:09    


 


Mono % (Auto)  7.8 % (0.0-7.3)  H  04/22/21  07:09    


 


Eos % (Auto)  0.9 % (0.0-4.3)   04/22/21  07:09    


 


Baso % (Auto)  0.6 % (0.0-1.8)   04/22/21  07:09    


 


Lymph # (Auto)  1.4 K/mm3 (1.2-5.4)   04/22/21  07:09    


 


Mono # (Auto)  1.0 K/mm3 (0.0-0.8)  H  04/22/21  07:09    


 


Eos # (Auto)  0.1 K/mm3 (0.0-0.4)   04/22/21  07:09    


 


Baso # (Auto)  0.1 K/mm3 (0.0-0.1)   04/22/21  07:09    


 


Seg Neutrophils %  79.6 % (40.0-70.0)  H  04/22/21  07:09    


 


Seg Neutrophils #  9.7 K/mm3 (1.8-7.7)  H  04/22/21  07:09    


 


Sodium  134 mmol/L (137-145)  L  04/22/21  07:09    


 


Potassium  3.2 mmol/L (3.6-5.0)  L  04/22/21  07:09    


 


Chloride  102.1 mmol/L ()   04/22/21  07:09    


 


Carbon Dioxide  22 mmol/L (22-30)   04/22/21  07:09    


 


Anion Gap  13 mmol/L  04/22/21  07:09    


 


BUN  11 mg/dL (7-17)   04/22/21  07:09    


 


Creatinine  0.6 mg/dL (0.6-1.2)   04/22/21  07:09    


 


Estimated GFR  > 60 ml/min  04/22/21  07:09    


 


BUN/Creatinine Ratio  18 %  04/22/21  07:09    


 


Glucose  134 mg/dL ()  H  04/22/21  07:09    


 


Hemoglobin A1c  5.2 % (4-6)   04/22/21  07:09    


 


Lactic Acid  1.00 mmol/L (0.7-2.0)   04/21/21  16:30    


 


Calcium  8.4 mg/dL (8.4-10.2)   04/22/21  07:09    


 


Total Bilirubin  0.80 mg/dL (0.1-1.2)   04/22/21  07:09    


 


AST  11 units/L (5-40)   04/22/21  07:09    


 


ALT  11 units/L (7-56)   04/22/21  07:09    


 


Alkaline Phosphatase  54 units/L ()   04/22/21  07:09    


 


Total Protein  5.3 g/dL (6.3-8.2)  L  04/22/21  07:09    


 


Albumin  3.3 g/dL (3.9-5)  L  04/22/21  07:09    


 


Albumin/Globulin Ratio  1.7 %  04/22/21  07:09    


 


Lipase  11 units/L (13-60)  L  04/21/21  13:34    


 


Urine Color  Dolores  (Yellow)   04/22/21  00:10    


 


Urine Turbidity  Clear  (Clear)   04/22/21  00:10    


 


Urine pH  5.0  (5.0-7.0)   04/22/21  00:10    


 


Ur Specific Gravity  1.060  (1.003-1.030)  H  04/22/21  00:10    


 


Urine Protein  30 mg/dl mg/dL (Negative)   04/22/21  00:10    


 


Urine Glucose (UA)  Neg mg/dL (Negative)   04/22/21  00:10    


 


Urine Ketones  80 mg/dL (Negative)   04/22/21  00:10    


 


Urine Blood  Neg  (Negative)   04/22/21  00:10    


 


Urine Nitrite  Neg  (Negative)   04/22/21  00:10    


 


Urine Bilirubin  Neg  (Negative)   04/22/21  00:10    


 


Urine Urobilinogen  4.0 mg/dL (<2.0)   04/22/21  00:10    


 


Ur Leukocyte Esterase  Neg  (Negative)   04/22/21  00:10    


 


Urine WBC (Auto)  9.0 /HPF (0.0-6.0)  H  04/22/21  00:10    


 


Urine RBC (Auto)  3.0 /HPF (0.0-6.0)   04/22/21  00:10    


 


U Epithel Cells (Auto)  1.0 /HPF (0-13.0)   04/22/21  00:10    


 


Urine Mucus  Few /HPF  04/22/21  00:10    














Active Medications





- Current Medications


Current Medications: 














Generic Name Dose Route Start Last Admin





  Trade Name Freq  PRN Reason Stop Dose Admin


 


Acetaminophen  650 mg  04/21/21 21:53 





  Acetaminophen 325 Mg Tab  PO  





  Q4H PRN  





  Pain MILD(1-3)/Fever >100.5/HA  


 


Famotidine  20 mg  04/21/21 22:00  04/22/21 07:59





  Famotidine 20 Mg/2 Ml Inj  IV   20 mg





  BID DUSTY   Administration


 


Heparin Sodium (Porcine)  5,000 unit  04/21/21 22:00  04/22/21 07:59





  Heparin 5,000 Unit/1 Ml Vial  SUB-Q   5,000 unit





  Q12HR DUSTY   Administration


 


Hydromorphone HCl  0.5 mg  04/21/21 21:46 





  Hydromorphone 1 Mg/1 Ml Inj  IV  





  Q3H PRN  





  Pain , Severe (7-10)  


 


Dextrose/Sodium Chloride  1,000 mls @ 125 mls/hr  04/21/21 22:00  04/22/21 00:46





  D5ns  IV   125 mls/hr





  AS DIRECT DUSTY   Administration


 


Piperacillin Sod/Tazobactam Sod  4.5 gm in 100 mls @ 200 mls/hr  04/22/21 06:00 

04/22/21 07:44





  Zosyn/Ns 4.5gm/100ml  IV   200 mls/hr





  Q8HR DUSTY   Administration





  Protocol  


 


Metoclopramide HCl  10 mg  04/21/21 21:53  04/22/21 01:24





  Metoclopramide 10 Mg/2 Ml Inj  IV   10 mg





  Q6H PRN   Administration





  Nausea And Vomiting  


 


Morphine Sulfate  2 mg  04/21/21 21:46 





  Morphine 2 Mg/1 Ml Inj  IV  





  Q4H PRN  





  Pain, Moderate (4-6)  


 


Ondansetron HCl  4 mg  04/21/21 21:53 





  Ondansetron 4 Mg/2 Ml Inj  IV  





  Q8H PRN  





  Nausea And Vomiting  


 


Sodium Chloride  10 ml  04/21/21 22:00  04/22/21 00:47





  Sodium Chloride 0.9% 10 Ml Flush Syringe  IV   10 ml





  BID DUSTY   Administration


 


Sodium Chloride  10 ml  04/21/21 21:46 





  Sodium Chloride 0.9% 10 Ml Flush Syringe  IV  





  PRN PRN  





  LINE FLUSH

## 2021-04-22 NOTE — XRAY REPORT
ABDOMEN 1 VIEW



INDICATION / CLINICAL INFORMATION:

enteritis, vomiting.



COMPARISON: 

CT abdomen and pelvis with contrast and acute abdomen series from 4/21/2021.



FINDINGS:



TUBES / LINES: None.

BOWEL GAS PATTERN: There is persistent generalized small bowel dilatation and distention of the right
: . 

FREE AIR / EXTRALUMINAL GAS: None seen.



ADDITIONAL FINDINGS: No significant additional findings.



IMPRESSION:

Similar findings of probable enterocolitis. Continued radiographic follow-up to resolution is recomme
nded.



Signer Name: Cliff Rosas MD 

Signed: 4/22/2021 9:11 AM

Workstation Name: [id number]

## 2021-04-23 ENCOUNTER — OUT OF OFFICE VISIT (OUTPATIENT)
Dept: URBAN - METROPOLITAN AREA MEDICAL CENTER 41 | Facility: MEDICAL CENTER | Age: 76
End: 2021-04-23
Payer: MEDICARE

## 2021-04-23 DIAGNOSIS — R63.4 ABNORMAL INTENTIONAL WEIGHT LOSS: ICD-10-CM

## 2021-04-23 DIAGNOSIS — R10.84 ABDOMINAL CRAMPING, GENERALIZED: ICD-10-CM

## 2021-04-23 DIAGNOSIS — R93.3 ABN FINDINGS-GI TRACT: ICD-10-CM

## 2021-04-23 DIAGNOSIS — C19 RECTOSIGMOID CANCER: ICD-10-CM

## 2021-04-23 DIAGNOSIS — Z53.8 FAILED ATTEMPTED SURGICAL PROCEDURE: ICD-10-CM

## 2021-04-23 DIAGNOSIS — K63.89 BACTERIAL OVERGROWTH SYNDROME: ICD-10-CM

## 2021-04-23 LAB
BASOPHILS # (AUTO): 0.1 K/MM3 (ref 0–0.1)
BASOPHILS NFR BLD AUTO: 0.6 % (ref 0–1.8)
BUN SERPL-MCNC: 10 MG/DL (ref 7–17)
BUN/CREAT SERPL: 17 %
CALCIUM SERPL-MCNC: 8.5 MG/DL (ref 8.4–10.2)
EOSINOPHIL # BLD AUTO: 0.1 K/MM3 (ref 0–0.4)
EOSINOPHIL NFR BLD AUTO: 0.5 % (ref 0–4.3)
HCT VFR BLD CALC: 46.9 % (ref 30.3–42.9)
HEMOLYSIS INDEX: 48
HGB BLD-MCNC: 16.1 GM/DL (ref 10.1–14.3)
LYMPHOCYTES # BLD AUTO: 1.6 K/MM3 (ref 1.2–5.4)
LYMPHOCYTES NFR BLD AUTO: 14.7 % (ref 13.4–35)
MCHC RBC AUTO-ENTMCNC: 34 % (ref 30–34)
MCV RBC AUTO: 95 FL (ref 79–97)
MONOCYTES # (AUTO): 1 K/MM3 (ref 0–0.8)
MONOCYTES % (AUTO): 8.5 % (ref 0–7.3)
PLATELET # BLD: 286 K/MM3 (ref 140–440)
RBC # BLD AUTO: 4.95 M/MM3 (ref 3.65–5.03)

## 2021-04-23 PROCEDURE — G8427 DOCREV CUR MEDS BY ELIG CLIN: HCPCS | Performed by: INTERNAL MEDICINE

## 2021-04-23 PROCEDURE — 99222 1ST HOSP IP/OBS MODERATE 55: CPT | Performed by: INTERNAL MEDICINE

## 2021-04-23 PROCEDURE — 45380 COLONOSCOPY AND BIOPSY: CPT | Performed by: INTERNAL MEDICINE

## 2021-04-23 PROCEDURE — 0DBP8ZX EXCISION OF RECTUM, VIA NATURAL OR ARTIFICIAL OPENING ENDOSCOPIC, DIAGNOSTIC: ICD-10-PCS | Performed by: INTERNAL MEDICINE

## 2021-04-23 PROCEDURE — 45381 COLONOSCOPY SUBMUCOUS NJX: CPT | Performed by: INTERNAL MEDICINE

## 2021-04-23 PROCEDURE — 0DBL8ZX EXCISION OF TRANSVERSE COLON, VIA NATURAL OR ARTIFICIAL OPENING ENDOSCOPIC, DIAGNOSTIC: ICD-10-PCS | Performed by: INTERNAL MEDICINE

## 2021-04-23 RX ADMIN — DEXTROSE, SODIUM CHLORIDE, AND POTASSIUM CHLORIDE SCH MLS/HR: 5; .9; .15 INJECTION INTRAVENOUS at 11:43

## 2021-04-23 RX ADMIN — PIPERACILLIN AND TAZOBACTAM SCH MLS/HR: 4; .5 INJECTION, POWDER, FOR SOLUTION INTRAVENOUS at 15:10

## 2021-04-23 RX ADMIN — LEVOTHYROXINE SODIUM SCH: 0.05 TABLET ORAL at 06:50

## 2021-04-23 RX ADMIN — FAMOTIDINE SCH MG: 10 INJECTION, SOLUTION INTRAVENOUS at 09:26

## 2021-04-23 RX ADMIN — HEPARIN SODIUM SCH UNIT: 5000 INJECTION, SOLUTION INTRAVENOUS; SUBCUTANEOUS at 21:27

## 2021-04-23 RX ADMIN — PIPERACILLIN AND TAZOBACTAM SCH MLS/HR: 4; .5 INJECTION, POWDER, FOR SOLUTION INTRAVENOUS at 06:57

## 2021-04-23 RX ADMIN — FAMOTIDINE SCH MG: 10 INJECTION, SOLUTION INTRAVENOUS at 21:27

## 2021-04-23 RX ADMIN — Medication SCH ML: at 09:26

## 2021-04-23 RX ADMIN — HEPARIN SODIUM SCH UNIT: 5000 INJECTION, SOLUTION INTRAVENOUS; SUBCUTANEOUS at 09:26

## 2021-04-23 RX ADMIN — DEXTROSE, SODIUM CHLORIDE, AND POTASSIUM CHLORIDE SCH MLS/HR: 5; .9; .15 INJECTION INTRAVENOUS at 01:15

## 2021-04-23 RX ADMIN — ONDANSETRON PRN MG: 2 INJECTION INTRAMUSCULAR; INTRAVENOUS at 06:56

## 2021-04-23 RX ADMIN — PIPERACILLIN AND TAZOBACTAM SCH MLS/HR: 4; .5 INJECTION, POWDER, FOR SOLUTION INTRAVENOUS at 00:04

## 2021-04-23 RX ADMIN — ONDANSETRON PRN MG: 2 INJECTION INTRAMUSCULAR; INTRAVENOUS at 15:49

## 2021-04-23 NOTE — PROGRESS NOTE
Assessment and Plan


76 yo F with 





1. enterocolitis


2. possible transverse colon mass





Pt stable. Diffuse small bowel and cecal dilatation 2/2 enterocolitis and 

possible mass in transverse colon causing partial obstruction





Plan:


1. NPO except ice chips


2. IVF


3. prn pain and nausea control


4. DVT ppx


5. OOB/ambulate


6. GI on board - cscope prep ordered last night but patient refused. Possible 

cscope today, will follow up. D/W Dr. Ybarra.


7. continue empiric abx


8.  Further surgical recommendations pending colonoscopy findings





Thank you, please call with questions





Evaluation and treatment of this patient was during the time of the national and

state emergency arising from COVID19 coronavirus pandemic. Treatment and 

procedures performed meet the current and available best practice and guidelines

for patient during the COVID pandemic.








Subjective


Date of service: 04/23/21


Narrative: 





Patient seen and examined.  She states that she had a much more restful night.  

She feels her stomach is settling down.  She had a very small bowel movement and

passed flatus overnight.  No nausea or vomiting.  Patient was scheduled for 

possible colonoscopy today but refused to do her prep because she was afraid of 

nausea/vomiting.





Objective


                               Vital Signs - 12hr











  04/22/21 04/23/21





  23:56 04:52


 


Temperature 97.5 F L 97.7 F


 


Pulse Rate 70 71


 


Respiratory 18 18





Rate  


 


Blood Pressure 176/87 171/96


 


O2 Sat by Pulse 96 96





Oximetry  














- General physical appearance


Narrative Exam: 





Gen.: Awake, alert, oriented 3.  No apparent distress


ENT: Trachea midline.  No lymphadenopathy.  No scleral icterus or conjunctival 

pallor


CV: S1, S2 present


Respiratory: No audible wheezes


Abdomen: Soft, nondistended, left-sided tenderness to palpationminimal.  No 

rebound, rigidity, guarding


Extremities: No clubbing, cyanosis, edema





- Labs





                                 04/23/21 05:15





                                 04/23/21 05:15


                                 Diabetes panel











  04/23/21 Range/Units





  05:15 


 


Sodium  142  D  (137-145)  mmol/L


 


Potassium  3.4 L  (3.6-5.0)  mmol/L


 


Chloride  108.0 H  ()  mmol/L


 


Carbon Dioxide  22  (22-30)  mmol/L


 


BUN  10  (7-17)  mg/dL


 


Creatinine  0.6  (0.6-1.2)  mg/dL


 


Glucose  122 H  ()  mg/dL


 


Calcium  8.5  (8.4-10.2)  mg/dL








                                  Calcium panel











  04/23/21 Range/Units





  05:15 


 


Calcium  8.5  (8.4-10.2)  mg/dL








                                 Pituitary panel











  04/23/21 Range/Units





  05:15 


 


Sodium  142  D  (137-145)  mmol/L


 


Potassium  3.4 L  (3.6-5.0)  mmol/L


 


Chloride  108.0 H  ()  mmol/L


 


Carbon Dioxide  22  (22-30)  mmol/L


 


BUN  10  (7-17)  mg/dL


 


Creatinine  0.6  (0.6-1.2)  mg/dL


 


Glucose  122 H  ()  mg/dL


 


Calcium  8.5  (8.4-10.2)  mg/dL








                                  Adrenal panel











  04/23/21 Range/Units





  05:15 


 


Sodium  142  D  (137-145)  mmol/L


 


Potassium  3.4 L  (3.6-5.0)  mmol/L


 


Chloride  108.0 H  ()  mmol/L


 


Carbon Dioxide  22  (22-30)  mmol/L


 


BUN  10  (7-17)  mg/dL


 


Creatinine  0.6  (0.6-1.2)  mg/dL


 


Glucose  122 H  ()  mg/dL


 


Calcium  8.5  (8.4-10.2)  mg/dL

## 2021-04-23 NOTE — EVENT NOTE
Date: 04/23/21


Surgeon   and GI Tate Dominguez recommended the patient to be transferred to 

[facility] for further evaluation and management by


Colorectal surgeon Dr. Carter.  Dr. Ybarra initiated the transfer process and 

informed me that transfer center from [facility] would contact me


Regarding transfer, I did not receive any 7:30 PM.  I will inform covering 

hospitalist to be on the look out for this call.

## 2021-04-23 NOTE — GASTROENTEROLOGY CONSULTATION
History of Present Illness





- Reason for Consult


Consult date: 04/23/21


Abnormal CAT scan, abdominal pain


Requesting physician: AMY J KOCHERLA





- History of Present Illness








This is a pleasant 75-year-old female who presents with worsening abdominal pain

nausea and vomiting


Patient reports for the last 2 months she has been having gradual change in 

bowel habits with abdominal distention and difficulty with passing her bowels


She reports approximately 2 weeks ago she developed worsening abdominal pain 

that was severe, "like two animals fighting inside of me" and associated nausea 

and vomiting


She reports she went to [facility] and was sent home


She reports that the pain has been coming in waves, she tried taking an enema to

help her go to the bathroom and to just make her pain worse and that she vomited

up feces since the enema came straight out her mouth and therefore came into the

emergency room here for further evaluation


She reports she has never had a colonoscopy


She reports weight loss over the last 1 to 2 months she is sure she has lost 

weight at least 10 pounds not sure of the exact amount however


She reports 1 saw blood in the stool however has not seen maroon stool or melena

and only had blood that one time


CT scan here concerning for possible transverse colon malignancy





Patient reports currently with nausea and decreased ability to eat and if she 

tries to take anything by mouth she has nausea and vomiting


She reports mild to moderate diffuse abdominal pain which is waxing and waning 

worse with eating and palpation better with nothing


No radiation


Associated symptoms as above





Reviewed records from [facility] last week.  Patient had a CAT scan

on April 14 which read:


Mild gaseous distention of the ascending colon.  Moderate stool scattered 

throughout the remainder of the colon.  No definite evidence of small or large 

bowel obstruction.





At that time her labs demonstrated white count 14.3, hemoglobin 15.4 and a 

comprehensive metabolic panel that was essentially normal





Obtained/updated/reviewed patient's current medications





Past History


Past Medical History: hypothyroidism


Past Surgical History: Other (Lower abdominal surgery for ovarian abscess, open 

cholecystectomy, ectopic pregnancy)


Social history: lives with family, smoking (Half a pack a day.), full code


Family history: hypertension





Medications and Allergies


                                    Allergies











Allergy/AdvReac Type Severity Reaction Status Date / Time


 


No Known Allergies Allergy   Verified 04/22/21 17:39











                                Home Medications











 Medication  Instructions  Recorded  Confirmed  Last Taken  Type


 


Levothyroxine [Synthroid] 50 mcg PO QAM 04/22/21 04/22/21 2 Weeks Ago History





    ~04/08/21 





    50 mcg 











Active Meds: 


Active Medications





Acetaminophen (Acetaminophen 325 Mg Tab)  650 mg PO Q4H PRN


   PRN Reason: Pain MILD(1-3)/Fever >100.5/HA


Famotidine (Famotidine 20 Mg/2 Ml Inj)  20 mg IV BID [facility]


   Last Admin: 04/22/21 22:40 Dose:  20 mg


   Documented by: 


Heparin Sodium (Porcine) (Heparin 5,000 Unit/1 Ml Vial)  5,000 unit SUB-Q Q12HR 

[facility]


   Last Admin: 04/22/21 22:00 Dose:  5,000 unit


   Documented by: 


Hydromorphone HCl (Hydromorphone 1 Mg/1 Ml Inj)  0.5 mg IV Q3H PRN


   PRN Reason: Pain , Severe (7-10)


Piperacillin Sod/Tazobactam Sod (Zosyn/Ns 4.5gm/100ml)  4.5 gm in 100 mls @ 200 

mls/hr IV Q8HR [facility]; Protocol


   Last Admin: 04/23/21 06:57 Dose:  200 mls/hr


   Documented by: 


Potassium Chloride/Dextrose/Sod Cl (D5w/Ns W/Kcl 20meq)  20 meq in 1,000 mls @ 

100 mls/hr IV AS DIRECT [facility]


   Last Admin: 04/23/21 01:15 Dose:  100 mls/hr


   Documented by: 


Levothyroxine Sodium (Levothyroxine 50 Mcg Tab)  50 mcg PO DAILY@0600 [facility]


   Last Admin: 04/23/21 06:50 Dose:  Not Given


   Documented by: 


Morphine Sulfate (Morphine 2 Mg/1 Ml Inj)  2 mg IV Q4H PRN


   PRN Reason: Pain, Moderate (4-6)


Ondansetron HCl (Ondansetron 4 Mg/2 Ml Inj)  4 mg IV Q8H PRN


   PRN Reason: Nausea And Vomiting


   Last Admin: 04/23/21 06:56 Dose:  4 mg


   Documented by: 


Sodium Chloride (Sodium Chloride 0.9% 10 Ml Flush Syringe)  10 ml IV BID [facility]


   Last Admin: 04/22/21 22:00 Dose:  Not Given


   Documented by: 


Sodium Chloride (Sodium Chloride 0.9% 10 Ml Flush Syringe)  10 ml IV PRN PRN


   PRN Reason: LINE FLUSH











Review of Systems





- Review of Systems


All systems: negative (10 Systems reviewed and negative except as mentioned ab

ove in the history of present illness)





Exam





- Constitutional


Vital Signs: 


                                        











Temp Pulse Resp BP Pulse Ox


 


 97.7 F   71   18   171/96   96 


 


 04/23/21 04:52  04/23/21 04:52  04/23/21 04:52  04/23/21 04:52  04/23/21 04:52











General appearance: no acute distress





- EENT


Eyes: EOM intact


ENT: hearing intact





- Neck


Neck: supple





- Respiratory


Respiratory effort: normal





- Cardiovascular


Rhythm: regular





- Gastrointestinal


General gastrointestinal: Present: soft, tender (Diffuse, though she reports 

worst in the left lower quadrant), normal bowel sounds





- Integumentary


Integumentary: Present: dry





- Musculoskeletal


Musculoskeletal: normal





- Neurologic


Neurological: alert and oriented x3





- Psychiatric


Psychiatric: appropriate mood/affect





- Labs


CBC & Chem 7: 


                                 04/23/21 05:15





                                 04/23/21 05:15


Lab Results: 


                         Laboratory Results - last 24 hr











  04/23/21 04/23/21





  05:15 05:15


 


WBC  11.3 H 


 


RBC  4.95 


 


Hgb  16.1 H 


 


Hct  46.9 H 


 


MCV  95 


 


MCH  33 H 


 


MCHC  34 


 


RDW  14.0 


 


Plt Count  286 


 


Lymph % (Auto)  14.7 


 


Mono % (Auto)  8.5 H 


 


Eos % (Auto)  0.5 


 


Baso % (Auto)  0.6 


 


Lymph # (Auto)  1.6 


 


Mono # (Auto)  1.0 H 


 


Eos # (Auto)  0.1 


 


Baso # (Auto)  0.1 


 


Seg Neutrophils %  75.7 H 


 


Seg Neutrophils #  8.5 H 


 


Chloride   108.0 H


 


Carbon Dioxide   22


 


Anion Gap   15


 


BUN   10


 


Glucose   122 H


 


Calcium   8.5


 


Magnesium   2.00














Assessment and Plan





Given the history above as well as CT scan findings here concerning for possible

 colon malignancy.  Therefore patient requires colonoscopy for further 

evaluation.  Try to provide her with a colon prep however she reports cannot 

take it


Therefore, also tried to give enemas she refused concerned that the enemas will 

come right up and push her stool out her mouth


Discussed with patient importance of having colonoscopy will determine what is 

going on, after discussion she is willing to try to proceed with a colonoscopy


Will need to provide patient with enemas which will hopefully provide enough 

colonic lines to be able to reach the lesion seen on the CAT scan


I am coordinating with the endoscopy team to determine when her procedure can 

happen, hopefully will be able to be this afternoon, though they reported 

schedule is busy and it may not be able to happen until Monday


Therefore we will keep patient n.p.o. and once have a time will be able to 

provide patient with tapwater enemas until clear and then proceed with attempted

 colonoscopy





I will contact patient's nurse and Dr Lovelace once I am given a time/date for the

 procedure





Differential diagnosis includes colon cancer, severe constipation, less likely 

inflammatory bowel disease extrinsic compression etc.





- Patient Problems


(1) Weight loss, non-intentional


Current Visit: Yes   Status: Acute   





(2) Abdominal pain


Current Visit: Yes   Status: Acute   





(3) Colon wall thickening


Current Visit: Yes   Status: Acute

## 2021-04-23 NOTE — ANESTHESIA CONSULTATION
Anesthesia Consult and Med Hx


Date of service: 04/23/21





- Airway


Anesthetic Teeth Evaluation: Good (Dental implant- upper full plate)


ROM Head & Neck: Adequate


Mental/Hyoid Distance: Adequate


Mallampati Class: Class I





- Pulmonary Exam


CTA: Yes





- Pre-Operative Health Status


ASA Pre-Surgery Classification: ASA2


Proposed Anesthetic Plan: MAC





- Pulmonary


Hx Smoking: Yes


Hx Asthma: No


Hx Respiratory Symptoms: No


SOB: No


COPD: No


Hx Sleep Apnea: No





- Cardiovascular System


Hx Hypertension: Yes


Hx Heart Attack/AMI: No


Hx Angina: No





- Central Nervous System


Hx Neuromuscular Disorder: No


Hx Seizures: No


Hx Psychiatric Problems: No





- Gastrointestinal


Hx Gastroesophageal Reflux Disease: No





- Endocrine


Hx Renal Disease: No


Hx Liver Disease: No


Hx Insulin Dependent Diabetes: No


Hx Non-Insulin Dependent Diabetes: No


Hx Hypothyroidism: No





- Hematic


Hx Anemia: No


Hx Sickle Cell Disease: No





- Other Systems


Hx Alcohol Use: Yes


Hx Obesity: No





- Additional Comments


Anesthesia Medical History Comments: Patient denied previous anesthesia 

complications

## 2021-04-23 NOTE — PROGRESS NOTE
Assessment and Plan


Assessment and plan: 


--Hypokalemia;


Current Visit: Yes   Status: Acute, 


KCl IV 40 mEq , check magnesium


Closely monitor electrolytes





-- Acute gastritis


Current Visit: Yes   Status: Acute, 


Plan to address problem: 


Small bowel loops are dilated


No signs of obstruction, N.p.o., IV fluids 


Pain medications supportive care


IV Zosyn started empirically


Surgery consulted





CT abdomen and pelvis ;


nonspecific mild small bowel dilation without transition point 


suspicious focal thickening of the transverse colon malignancy cannot be 

excluded 





Abdominal x-ray;


Similar findings of probable enterocolitis


Radiographic follow-up to resolution needed





--Polycythemia due to hemoconcentration


Current Visit: Yes   Status: Acute   


Plan to address problem: 


IV Fluids , closely monitor





-- Colon wall thickening


Current Visit: Yes   Status: Acute   


Plan to address problem: 


Transverse colon is thickened


Malignancy to be ruled out


GI evaluated the patient


For colonoscopy today





-- Hyponatremia


Current Visit: Yes   Status: Acute   


Plan to address problem: 


Mild\ IV fluids for now





--Malnutrition


Current Visit: Yes   Status: Chronic   


Plan to address problem: 


Dietary supplements once she starts eating





-- DVT prophylaxis


Current Visit: Yes   Status: Acute   


Plan to address problem: 


On heparin and GI prophylaxis





Follow surgery and GI evaluation and recommendations


Closely monitor the patient and adjust the management as needed


Plan of care reviewed with the patient and her nurse











History


Interval history: 


Patient is scheduled for endoscopy


N.p.o. status


No new complaints


Vital signs noted








Hospitalist Physical





- Constitutional


Vitals: 


                                        











Temp Pulse Resp BP Pulse Ox


 


 97.7 F   71   20   171/96   96 


 


 04/23/21 04:52  04/23/21 04:52  04/23/21 10:14  04/23/21 04:52  04/23/21 04:52











General appearance: Present: mild distress, well-nourished





- EENT


Eyes: Present: PERRL, EOM intact





- Neck


Neck: Present: supple, normal ROM





- Respiratory


Respiratory effort: normal


Respiratory: bilateral: diminished, negative: rales, rhonchi, wheezing





- Cardiovascular


Rhythm: regular


Heart Sounds: Present: S1 & S2





- Extremities


Extremities: no ischemia, No edema





- Abdominal


General gastrointestinal: soft, non-tender, non-distended, normal bowel sounds





- Integumentary


Integumentary: Present: clear, warm





- Psychiatric


Psychiatric: appropriate mood/affect, cooperative





- Neurologic


Neurologic: CNII-XII intact, moves all extremities





Results





- Labs


CBC & Chem 7: 


                                 04/23/21 05:15





                                 04/23/21 05:15


Labs: 


                             Laboratory Last Values











WBC  11.3 K/mm3 (4.5-11.0)  H  04/23/21  05:15    


 


RBC  4.95 M/mm3 (3.65-5.03)   04/23/21  05:15    


 


Hgb  16.1 gm/dl (10.1-14.3)  H  04/23/21  05:15    


 


Hct  46.9 % (30.3-42.9)  H  04/23/21  05:15    


 


MCV  95 fl (79-97)   04/23/21  05:15    


 


MCH  33 pg (28-32)  H  04/23/21  05:15    


 


MCHC  34 % (30-34)   04/23/21  05:15    


 


RDW  14.0 % (13.2-15.2)   04/23/21  05:15    


 


Plt Count  286 K/mm3 (140-440)   04/23/21  05:15    


 


Lymph % (Auto)  14.7 % (13.4-35.0)   04/23/21  05:15    


 


Mono % (Auto)  8.5 % (0.0-7.3)  H  04/23/21  05:15    


 


Eos % (Auto)  0.5 % (0.0-4.3)   04/23/21  05:15    


 


Baso % (Auto)  0.6 % (0.0-1.8)   04/23/21  05:15    


 


Lymph # (Auto)  1.6 K/mm3 (1.2-5.4)   04/23/21  05:15    


 


Mono # (Auto)  1.0 K/mm3 (0.0-0.8)  H  04/23/21  05:15    


 


Eos # (Auto)  0.1 K/mm3 (0.0-0.4)   04/23/21  05:15    


 


Baso # (Auto)  0.1 K/mm3 (0.0-0.1)   04/23/21  05:15    


 


Seg Neutrophils %  75.7 % (40.0-70.0)  H  04/23/21  05:15    


 


Seg Neutrophils #  8.5 K/mm3 (1.8-7.7)  H  04/23/21  05:15    


 


Sodium  142 mmol/L (137-145)  D 04/23/21  05:15    


 


Potassium  3.4 mmol/L (3.6-5.0)  L  04/23/21  05:15    


 


Chloride  108.0 mmol/L ()  H  04/23/21  05:15    


 


Carbon Dioxide  22 mmol/L (22-30)   04/23/21  05:15    


 


Anion Gap  15 mmol/L  04/23/21  05:15    


 


BUN  10 mg/dL (7-17)   04/23/21  05:15    


 


Creatinine  0.6 mg/dL (0.6-1.2)   04/23/21  05:15    


 


Estimated GFR  > 60 ml/min  04/23/21  05:15    


 


BUN/Creatinine Ratio  17 %  04/23/21  05:15    


 


Glucose  122 mg/dL ()  H  04/23/21  05:15    


 


Hemoglobin A1c  5.2 % (4-6)   04/22/21  07:09    


 


Lactic Acid  1.00 mmol/L (0.7-2.0)   04/21/21  16:30    


 


Calcium  8.5 mg/dL (8.4-10.2)   04/23/21  05:15    


 


Magnesium  2.00 mg/dL (1.7-2.3)   04/23/21  05:15    


 


Total Bilirubin  0.80 mg/dL (0.1-1.2)   04/22/21  07:09    


 


AST  11 units/L (5-40)   04/22/21  07:09    


 


ALT  11 units/L (7-56)   04/22/21  07:09    


 


Alkaline Phosphatase  54 units/L ()   04/22/21  07:09    


 


Total Protein  5.3 g/dL (6.3-8.2)  L  04/22/21  07:09    


 


Albumin  3.3 g/dL (3.9-5)  L  04/22/21  07:09    


 


Albumin/Globulin Ratio  1.7 %  04/22/21  07:09    


 


Lipase  11 units/L (13-60)  L  04/21/21  13:34    


 


Urine Color  Dolores  (Yellow)   04/22/21  00:10    


 


Urine Turbidity  Clear  (Clear)   04/22/21  00:10    


 


Urine pH  5.0  (5.0-7.0)   04/22/21  00:10    


 


Ur Specific Gravity  1.060  (1.003-1.030)  H  04/22/21  00:10    


 


Urine Protein  30 mg/dl mg/dL (Negative)   04/22/21  00:10    


 


Urine Glucose (UA)  Neg mg/dL (Negative)   04/22/21  00:10    


 


Urine Ketones  80 mg/dL (Negative)   04/22/21  00:10    


 


Urine Blood  Neg  (Negative)   04/22/21  00:10    


 


Urine Nitrite  Neg  (Negative)   04/22/21  00:10    


 


Urine Bilirubin  Neg  (Negative)   04/22/21  00:10    


 


Urine Urobilinogen  4.0 mg/dL (<2.0)   04/22/21  00:10    


 


Ur Leukocyte Esterase  Neg  (Negative)   04/22/21  00:10    


 


Urine WBC (Auto)  9.0 /HPF (0.0-6.0)  H  04/22/21  00:10    


 


Urine RBC (Auto)  3.0 /HPF (0.0-6.0)   04/22/21  00:10    


 


U Epithel Cells (Auto)  1.0 /HPF (0-13.0)   04/22/21  00:10    


 


Urine Mucus  Few /HPF  04/22/21  00:10    











Sosa/IV: 


                                        





Voiding Method                   Toilet











Active Medications





- Current Medications


Current Medications: 














Generic Name Dose Route Start Last Admin





  Trade Name Freq  PRN Reason Stop Dose Admin


 


Acetaminophen  650 mg  04/21/21 21:53 





  Acetaminophen 325 Mg Tab  PO  





  Q4H PRN  





  Pain MILD(1-3)/Fever >100.5/HA  


 


Famotidine  20 mg  04/21/21 22:00  04/23/21 09:26





  Famotidine 20 Mg/2 Ml Inj  IV   20 mg





  BID DUSTY   Administration


 


Heparin Sodium (Porcine)  5,000 unit  04/21/21 22:00  04/23/21 09:26





  Heparin 5,000 Unit/1 Ml Vial  SUB-Q   5,000 unit





  Q12HR DUSTY   Administration


 


Hydromorphone HCl  0.5 mg  04/21/21 21:46 





  Hydromorphone 1 Mg/1 Ml Inj  IV  





  Q3H PRN  





  Pain , Severe (7-10)  


 


Piperacillin Sod/Tazobactam Sod  4.5 gm in 100 mls @ 200 mls/hr  04/22/21 06:00 

04/23/21 06:57





  Zosyn/Ns 4.5gm/100ml  IV   200 mls/hr





  Q8HR DUSTY   Administration





  Protocol  


 


Potassium Chloride/Dextrose/Sod Cl  20 meq in 1,000 mls @ 100 mls/hr  04/22/21 

23:45  04/23/21 01:15





  D5w/Ns W/Kcl 20meq  IV   100 mls/hr





  AS DIRECT DUSTY   Administration


 


Levothyroxine Sodium  50 mcg  04/23/21 06:00  04/23/21 06:50





  Levothyroxine 50 Mcg Tab  PO   Not Given





  DAILY@0600 DUSTY  


 


Morphine Sulfate  2 mg  04/21/21 21:46 





  Morphine 2 Mg/1 Ml Inj  IV  





  Q4H PRN  





  Pain, Moderate (4-6)  


 


Ondansetron HCl  4 mg  04/21/21 21:53  04/23/21 06:56





  Ondansetron 4 Mg/2 Ml Inj  IV   4 mg





  Q8H PRN   Administration





  Nausea And Vomiting  


 


Sodium Chloride  10 ml  04/21/21 22:00  04/23/21 09:26





  Sodium Chloride 0.9% 10 Ml Flush Syringe  IV   10 ml





  BID DUSTY   Administration


 


Sodium Chloride  10 ml  04/21/21 21:46 





  Sodium Chloride 0.9% 10 Ml Flush Syringe  IV  





  PRN PRN  





  LINE FLUSH

## 2021-04-23 NOTE — DISCHARGE SUMMARY
Providers





- Providers


Date of Admission: 


04/22/21 16:15





Date of discharge: 04/26/21


Attending physician: 


AMY J KOCHERLA





                                        





04/21/21 17:42


Consult to Physician [CONS] Stat 


   Comment: 


   Consulting Provider: RAMYA PINEDA


   Physician Instructions: 


   Reason For Exam: Abdominal pain, dilated small bowel loops.





04/22/21 12:39


Consult to Physician [CONS] Routine 


   Comment: 


   Consulting Provider: ANABELA BRENNAN


   Physician Instructions: 


   Reason For Exam: Enterocolitis/ constipation/











Primary care physician: 


PRIMARY CARE MD








Hospitalization


Reason for admission: Abdominal pain/vomiting of 2 weeks


Condition: Serious


Pertinent studies: 


Colonoscopy with biopsy





CT abdomen and pelvis ;


nonspecific mild small bowel dilation without transition point 


suspicious focal thickening of the transverse colon malignancy cannot be 

excluded 





Abdominal x-ray;


Similar findings of probable enterocolitis


Radiographic follow-up to resolution needed





Procedures: 


Colonoscopy with biopsy 4/23/2021


FINDINGS: 


* Obstructing colon mass seen in the transverse colon corresponding with the 

  finding on the CAT scan.  Could not advance the scope beyond this lesion.  It 

  appeared to take up approximately 75% of the circumference of the lumen but 

  was causing significant edema and therefore causing luminal narrowing and 

  could not advance the scope beyond this.  Cold forceps used to obtain biopsies

   of the lesion, 3 cc of spot ink were used to tattoo the lesion


* Large circumferential malignant appearing ulcerated and oozing mass seen in 

  the rectum starting from approximately 4 cm from the anal verge proceeding 

  proximally for about 5 cm.  Cold forceps used to obtain biopsies and 2 cc spot

   ink used to tattoo the distal margin


* Large amount of formed solid stool throughout the entire visualized colon 

  severely limiting views





RECOMMENDATIONS: 


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 

  transverse colon and one in the rectum.  I cannot rule out small medium or 

  large polyps in the visualized portion of the colon.  Was unable to visualize 

  proximal to the transverse colon mass to rule out other lesions


* Given the nearly obstructing nature of the transverse colon mass patient will 

  require surgical intervention.  She additionally will require surgery for the 

  rectal mass as well


* Once she is postoperative and healed from her surgery she will require repeat 

  colonoscopy to ensure no other lesions in the portions of the colon that were 

  unable to be visualized


GI and surgery has evaluated the patient and recommend transfer to [facility] to the service of Dr. Carter colorectal surgeon for further evaluation


And management.  Dr. Tate SARMIENTO has initiated the transfer.


Awaiting call from [facility] course: 


75-year-old female patient was admitted through emergency room with intractable 

nausea vomiting and abdominal pain


CT scan in the emergency room is consistent with abnormal findings colonic 

thickening of transverse colon.  Patient also had constipation


Evaluated by general surgery and later GI and patient underwent colonoscopy with

 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


And the other one in the rectum.  Biopsies were done, both surgeon and GI 

recommended transfer to colorectal surgeon in [facility] Dr. Raul Ybarra discussed with Dr. Carter who accepted the patient ,for further 

evaluation and management, and initiated the transfer process ,awaiting 

callback.


I called the transfer center on 4/24/2021 and discussed about the transfer, they

 informed me that they do not have beds and would call back when bed is 

available.  Awaiting callback from transfer center.  


Discharge and transfer the patient to different hospital to the service of 

colorectal surgeon  for further evaluation and management








Final diagnosis;


--Transverse colon mass/malignancy and


Rectal mass/malignancy


On colonoscopy biopsies done pending report


Dr. Tate SARMIENTO has discussed with Dr. Carter colorectal surgeon


For possible transfer to [facility] for further evaluation and 

management


Initiated the transfer process awaiting callback





--Hypokalemia;


Current Visit: Yes   Status: Acute,


KCl IV 40 mEq , check magnesium


Closely monitor electrolytes





-- Acute gastritis


Current Visit: Yes   Status: Acute, 


Small bowel loops are dilated


GI and surgeon evaluated the patient


Patient underwent colonoscopy and biopsy





-- Hyponatremia


Current Visit: Yes   Status: Acute   


Mild improvement continue IV fluids





--Malnutrition


Current Visit: Yes   Status: Chronic   


Plan to address problem: 


Dietary supplements once she starts eating





-- DVT prophylaxis


Current Visit: Yes   Status: Acute   


Plan to address problem: 


On heparin and GI prophylaxis





Continue current management


Initiated transfer to [facility] under the care of surgeon Dr. Carter


Awaiting callback when beds are available








Dis[id number]


Final Discharge Diagnosis (Prints w/discharge instructions): Transverse colon 

mass possible cancer.  Rectal mass possible cancer.  Acute gastritis.  

Hypokalemia.  Colon wall thickening.  Hyponatremia.  Malnutrition


Time spent for discharge: 35 min





Core Measure Documentation





- Palliative Care


Palliative Care/ Comfort Measures: Not Applicable





- Core Measures


Any of the following diagnoses?: none





Exam





- Constitutional


Vitals: 


                                        











Temp Pulse Resp BP Pulse Ox


 


 97.5 F L  62   20   155/76   97 


 


 04/23/21 13:35  04/23/21 15:24  04/23/21 15:24  04/23/21 15:24  04/23/21 15:24











General appearance: Present: mild distress, well-nourished





- EENT


Eyes: Present: PERRL, EOM intact





- Neck


Neck: Present: supple, normal ROM





- Respiratory


Respiratory effort: normal


Respiratory: bilateral: diminished, negative: rales, rhonchi, wheezing





- Cardiovascular


Rhythm: regular


Heart Sounds: Present: S1 & S2





- Extremities


Extremities: no ischemia, No edema





- Abdominal


General gastrointestinal: Present: soft, non-tender, non-distended, normal bowel

 sounds





- Integumentary


Integumentary: Present: clear, warm





- Musculoskeletal


Musculoskeletal: generalized weakness





- Psychiatric


Psychiatric: appropriate mood/affect, cooperative





- Neurologic


Neurologic: CNII-XII intact, moves all extremities





Plan


Activity: advance as tolerated, fall precautions


Diet: other (npo)


Additional Instructions: Transfer to [facility] for further 

evaluation and management by colorectal surgeon Dr. Carter


Follow up with: 


PRIMARY CARE,MD [Primary Care Provider] - 3-5 Days

## 2021-04-23 NOTE — ANESTHESIA DAY OF SURGERY
Anesthesia Day of Surgery





- Day of Surgery


Patient Examined: Yes


Patient H&P Reviewed: Yes


Patient is NPO: Yes


Beta Blockers: No


Cardiac Clearance: No


Pulmonary Clearance: No


Sea's Test: N/A

## 2021-04-23 NOTE — EVENT NOTE
Date: 04/23/21





Cscope results reviewed with Dr. Ybarra. Patient with malignant appearing near 

obstructing mass of transverse colon that could not be traversed with 

colonoscope. Also with syncronous circumfrential malignant appearing rectal 

lesion. Spoke with patient in detail about the results and discussed my 

recommendations for transfer to facility with colorectal surgery to address both

lesions. I do not recommend discharge and outpatient follow up due to near 

obstructing nature of transverse colon mass. She understands and is agreeable. 

Transfer to [facility] initiated.





I also discussed with patient's partner at her request and all questions 

answered.

## 2021-04-23 NOTE — OPERATIVE REPORT
Operative Report


Operative Report: 





DOS: 4/23/21





SURGEON: Cole Ybarra MD





COLONOSCOPY with biopsy and tattoo REPORT





PREOPERATIVE AND POSTOPERATIVE DIAGNOSIS: Abnormal CAT scan of the colon, weight

loss, abdominal pain





DESCRIPTION OF PROCEDURE: The colonoscope was passed to the transverse colon and

could not be advanced further due to colon massAt the end of procedure, the 

scope was cleaned using normal technique. Vital signs monitored continuously 

throughout.





SEDATION: Provided by [facility]. 





Quality of the prep was very poor





COMPLICATIONS: None.





ESTIMATED BLOOD LOSS: 


5 cc





FINDINGS: 


* Obstructing colon mass seen in the transverse colon corresponding with the 

  finding on the CAT scan.  Could not advance the scope beyond this lesion.  It 

  appeared to take up approximately 75% of the circumference of the lumen but 

  was causing significant edema and therefore causing luminal narrowing and 

  could not advance the scope beyond this.  Cold forceps used to obtain biopsies

  of the lesion, 3 cc of spot ink were used to tattoo the lesion


* Large circumferential malignant appearing ulcerated and oozing mass seen in 

  the rectum starting from approximately 4 cm from the anal verge proceeding 

  proximally for about 5 cm.  Cold forceps used to obtain biopsies and 2 cc spot

  ink used to tattoo the distal margin


* Large amount of formed solid stool throughout the entire visualized colon 

  severely limiting views





RECOMMENDATIONS: 


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 

  transverse colon and one in the rectum.  I cannot rule out small medium or 

  large polyps in the visualized portion of the colon.  Was unable to visualize 

  proximal to the transverse colon mass to rule out other lesions


* Given the nearly obstructing nature of the transverse colon mass patient will 

  require surgical intervention.  She additionally will require surgery for the 

  rectal mass as well


* Once she is postoperative and healed from her surgery she will require repeat 

  colonoscopy to ensure no other lesions in the portions of the colon that were 

  unable to be visualized





GI will sign off, plan of care per surgery whom I informed of the colonoscopy 

results.  Please call us back if we can be of any further assistance

## 2021-04-24 RX ADMIN — DEXTROSE, SODIUM CHLORIDE, AND POTASSIUM CHLORIDE SCH MLS/HR: 5; .9; .15 INJECTION INTRAVENOUS at 18:34

## 2021-04-24 RX ADMIN — HEPARIN SODIUM SCH UNIT: 5000 INJECTION, SOLUTION INTRAVENOUS; SUBCUTANEOUS at 22:43

## 2021-04-24 RX ADMIN — FAMOTIDINE SCH MG: 10 INJECTION, SOLUTION INTRAVENOUS at 09:04

## 2021-04-24 RX ADMIN — Medication SCH ML: at 06:11

## 2021-04-24 RX ADMIN — ONDANSETRON PRN MG: 2 INJECTION INTRAMUSCULAR; INTRAVENOUS at 06:09

## 2021-04-24 RX ADMIN — Medication SCH ML: at 09:05

## 2021-04-24 RX ADMIN — FAMOTIDINE SCH MG: 10 INJECTION, SOLUTION INTRAVENOUS at 22:43

## 2021-04-24 RX ADMIN — ONDANSETRON PRN MG: 2 INJECTION INTRAMUSCULAR; INTRAVENOUS at 14:00

## 2021-04-24 RX ADMIN — PIPERACILLIN AND TAZOBACTAM SCH MLS/HR: 4; .5 INJECTION, POWDER, FOR SOLUTION INTRAVENOUS at 06:10

## 2021-04-24 RX ADMIN — ONDANSETRON PRN MG: 2 INJECTION INTRAMUSCULAR; INTRAVENOUS at 22:43

## 2021-04-24 RX ADMIN — PIPERACILLIN AND TAZOBACTAM SCH MLS/HR: 4; .5 INJECTION, POWDER, FOR SOLUTION INTRAVENOUS at 00:05

## 2021-04-24 RX ADMIN — PIPERACILLIN AND TAZOBACTAM SCH MLS/HR: 4; .5 INJECTION, POWDER, FOR SOLUTION INTRAVENOUS at 14:00

## 2021-04-24 RX ADMIN — LEVOTHYROXINE SODIUM SCH: 0.05 TABLET ORAL at 09:01

## 2021-04-24 RX ADMIN — HEPARIN SODIUM SCH UNIT: 5000 INJECTION, SOLUTION INTRAVENOUS; SUBCUTANEOUS at 09:04

## 2021-04-24 RX ADMIN — PIPERACILLIN AND TAZOBACTAM SCH MLS/HR: 4; .5 INJECTION, POWDER, FOR SOLUTION INTRAVENOUS at 22:00

## 2021-04-24 NOTE — PROGRESS NOTE
Assessment and Plan


Assessment and plan: 


--Transverse colon mass/malignancy and


Rectal mass/malignancy


 biopsies done pending report


Dr. Tate SARMIENTO has discussed with Dr. Carter colorectal surgeon


For possible transfer to [facility]. 


waiting for callback from [facility]


I called back again this morning, no beds available


Informed that they would call back when the bed is available for the patient


Dr. Ybarra and Dr. Lovelace are made aware





--Hypokalemia;


Current Visit: Yes   Status: Acute,


Replenish and monitor electrolytes





-- Acute gastritis


Current Visit: Yes   Status: Acute, 


Secondary to obstructing colon cancer


Patient is tolerating clear liquids





-- Colon wall thickening


Current Visit: Yes   Status: Acute   


Colonoscopy positive for transverse colon mass


Rectal mass/malignancy





-- Hyponatremia/resolved


Current Visit: Yes   Status: Acute   


Continue IV fluids and supportive care





--Moderate malnutrition


Current Visit: Yes   Status: Chronic   


Plan to address problem: 


Dietary supplements once she starts eating





-- DVT prophylaxis


Current Visit: Yes   Status: Acute   


Plan to address problem: 


On heparin and GI prophylaxis





Awaiting response from [facility]


To transfer the patient for further evaluation management by colorectal surgeon 

Dr. Carter.





Plan of care reviewed with the patient and her nurse as well as surgeon











History


Interval history: 


I have seen and examined the patient at the bedside this morning


Patient's chart and medications reviewed


Patient feels slightly better tolerating clear liquids


Mild abdominal discomfort and nausea


Vital signs noted








Hospitalist Physical





- Constitutional


Vitals: 


                                        











Temp Pulse Resp BP Pulse Ox


 


 97.5 F L  60   20   164/66   96 


 


 04/24/21 11:05  04/24/21 11:05  04/24/21 11:05  04/24/21 11:05  04/24/21 11:05











General appearance: Present: mild distress, well-nourished





- EENT


Eyes: Present: PERRL, EOM intact





- Neck


Neck: Present: supple, normal ROM





- Respiratory


Respiratory effort: normal


Respiratory: bilateral: diminished, rales, negative: rhonchi, wheezing





- Cardiovascular


Rhythm: regular


Heart Sounds: Present: S1 & S2





- Extremities


Extremities: no ischemia, No edema





- Abdominal


General gastrointestinal: soft, non-tender, distended (Mild no guarding no 

rigidity)





- Integumentary


Integumentary: Present: clear, warm





- Psychiatric


Psychiatric: appropriate mood/affect, cooperative





- Neurologic


Neurologic: CNII-XII intact, moves all extremities





Results





- Labs


CBC & Chem 7: 


                                 04/23/21 05:15





                                 04/23/21 05:15


Labs: 


                             Laboratory Last Values











WBC  11.3 K/mm3 (4.5-11.0)  H  04/23/21  05:15    


 


RBC  4.95 M/mm3 (3.65-5.03)   04/23/21  05:15    


 


Hgb  16.1 gm/dl (10.1-14.3)  H  04/23/21  05:15    


 


Hct  46.9 % (30.3-42.9)  H  04/23/21  05:15    


 


MCV  95 fl (79-97)   04/23/21  05:15    


 


MCH  33 pg (28-32)  H  04/23/21  05:15    


 


MCHC  34 % (30-34)   04/23/21  05:15    


 


RDW  14.0 % (13.2-15.2)   04/23/21  05:15    


 


Plt Count  286 K/mm3 (140-440)   04/23/21  05:15    


 


Lymph % (Auto)  14.7 % (13.4-35.0)   04/23/21  05:15    


 


Mono % (Auto)  8.5 % (0.0-7.3)  H  04/23/21  05:15    


 


Eos % (Auto)  0.5 % (0.0-4.3)   04/23/21  05:15    


 


Baso % (Auto)  0.6 % (0.0-1.8)   04/23/21  05:15    


 


Lymph # (Auto)  1.6 K/mm3 (1.2-5.4)   04/23/21  05:15    


 


Mono # (Auto)  1.0 K/mm3 (0.0-0.8)  H  04/23/21  05:15    


 


Eos # (Auto)  0.1 K/mm3 (0.0-0.4)   04/23/21  05:15    


 


Baso # (Auto)  0.1 K/mm3 (0.0-0.1)   04/23/21  05:15    


 


Seg Neutrophils %  75.7 % (40.0-70.0)  H  04/23/21  05:15    


 


Seg Neutrophils #  8.5 K/mm3 (1.8-7.7)  H  04/23/21  05:15    


 


Sodium  142 mmol/L (137-145)  D 04/23/21  05:15    


 


Potassium  3.4 mmol/L (3.6-5.0)  L  04/23/21  05:15    


 


Chloride  108.0 mmol/L ()  H  04/23/21  05:15    


 


Carbon Dioxide  22 mmol/L (22-30)   04/23/21  05:15    


 


Anion Gap  15 mmol/L  04/23/21  05:15    


 


BUN  10 mg/dL (7-17)   04/23/21  05:15    


 


Creatinine  0.6 mg/dL (0.6-1.2)   04/23/21  05:15    


 


Estimated GFR  > 60 ml/min  04/23/21  05:15    


 


BUN/Creatinine Ratio  17 %  04/23/21  05:15    


 


Glucose  122 mg/dL ()  H  04/23/21  05:15    


 


Hemoglobin A1c  5.2 % (4-6)   04/22/21  07:09    


 


Lactic Acid  1.00 mmol/L (0.7-2.0)   04/21/21  16:30    


 


Calcium  8.5 mg/dL (8.4-10.2)   04/23/21  05:15    


 


Magnesium  2.00 mg/dL (1.7-2.3)   04/23/21  05:15    


 


Total Bilirubin  0.80 mg/dL (0.1-1.2)   04/22/21  07:09    


 


AST  11 units/L (5-40)   04/22/21  07:09    


 


ALT  11 units/L (7-56)   04/22/21  07:09    


 


Alkaline Phosphatase  54 units/L ()   04/22/21  07:09    


 


Total Protein  5.3 g/dL (6.3-8.2)  L  04/22/21  07:09    


 


Albumin  3.3 g/dL (3.9-5)  L  04/22/21  07:09    


 


Albumin/Globulin Ratio  1.7 %  04/22/21  07:09    


 


Lipase  11 units/L (13-60)  L  04/21/21  13:34    


 


Urine Color  Dolores  (Yellow)   04/22/21  00:10    


 


Urine Turbidity  Clear  (Clear)   04/22/21  00:10    


 


Urine pH  5.0  (5.0-7.0)   04/22/21  00:10    


 


Ur Specific Gravity  1.060  (1.003-1.030)  H  04/22/21  00:10    


 


Urine Protein  30 mg/dl mg/dL (Negative)   04/22/21  00:10    


 


Urine Glucose (UA)  Neg mg/dL (Negative)   04/22/21  00:10    


 


Urine Ketones  80 mg/dL (Negative)   04/22/21  00:10    


 


Urine Blood  Neg  (Negative)   04/22/21  00:10    


 


Urine Nitrite  Neg  (Negative)   04/22/21  00:10    


 


Urine Bilirubin  Neg  (Negative)   04/22/21  00:10    


 


Urine Urobilinogen  4.0 mg/dL (<2.0)   04/22/21  00:10    


 


Ur Leukocyte Esterase  Neg  (Negative)   04/22/21  00:10    


 


Urine WBC (Auto)  9.0 /HPF (0.0-6.0)  H  04/22/21  00:10    


 


Urine RBC (Auto)  3.0 /HPF (0.0-6.0)   04/22/21  00:10    


 


U Epithel Cells (Auto)  1.0 /HPF (0-13.0)   04/22/21  00:10    


 


Urine Mucus  Few /HPF  04/22/21  00:10    











Microbiology: 


Microbiology





04/22/21 00:10   Urine,Clean Catch   Urine Culture - Preliminary


                            NO GROWTH AFTER 24 HOURS








Sosa/IV: 


                                        





Voiding Method                   Toilet











Active Medications





- Current Medications


Current Medications: 














Generic Name Dose Route Start Last Admin





  Trade Name Freq  PRN Reason Stop Dose Admin


 


Acetaminophen  650 mg  04/21/21 21:53 





  Acetaminophen 325 Mg Tab  PO  





  Q4H PRN  





  Pain MILD(1-3)/Fever >100.5/HA  


 


Famotidine  20 mg  04/21/21 22:00  04/24/21 09:04





  Famotidine 20 Mg/2 Ml Inj  IV   20 mg





  BID DUSTY   Administration


 


Heparin Sodium (Porcine)  5,000 unit  04/21/21 22:00  04/24/21 09:04





  Heparin 5,000 Unit/1 Ml Vial  SUB-Q   5,000 unit





  Q12HR DUSTY   Administration


 


Hydromorphone HCl  0.5 mg  04/21/21 21:46 





  Hydromorphone 1 Mg/1 Ml Inj  IV  





  Q3H PRN  





  Pain , Severe (7-10)  


 


Piperacillin Sod/Tazobactam Sod  4.5 gm in 100 mls @ 200 mls/hr  04/22/21 06:00 

04/24/21 14:00





  Zosyn/Ns 4.5gm/100ml  IV   200 mls/hr





  Q8HR DUSTY   Administration





  Protocol  


 


Potassium Chloride/Dextrose/Sod Cl  20 meq in 1,000 mls @ 100 mls/hr  04/22/21 

23:45  04/23/21 11:43





  D5w/Ns W/Kcl 20meq  IV   100 mls/hr





  AS DIRECT DUSTY   Administration


 


Levothyroxine Sodium  50 mcg  04/23/21 06:00  04/24/21 09:01





  Levothyroxine 50 Mcg Tab  PO   Not Given





  DAILY@0600 DUSTY  


 


Morphine Sulfate  2 mg  04/21/21 21:46 





  Morphine 2 Mg/1 Ml Inj  IV  





  Q4H PRN  





  Pain, Moderate (4-6)  


 


Ondansetron HCl  4 mg  04/21/21 21:53  04/24/21 14:00





  Ondansetron 4 Mg/2 Ml Inj  IV   4 mg





  Q8H PRN   Administration





  Nausea And Vomiting  


 


Sodium Chloride  10 ml  04/21/21 22:00  04/24/21 09:05





  Sodium Chloride 0.9% 10 Ml Flush Syringe  IV   10 ml





  BID DUSTY   Administration


 


Sodium Chloride  10 ml  04/21/21 21:46 





  Sodium Chloride 0.9% 10 Ml Flush Syringe  IV  





  PRN PRN  





  LINE FLUSH

## 2021-04-24 NOTE — EVENT NOTE
Date: 04/24/21


I called [facility] control/transfer center at  and 

discussed with bed control nurse Ms. Domínguez regarding the transfer of


Patient Ms. Tor Zelaya to see colorectal surgeon Dr. Carter, to be admitted 

to hospitalist service.  Initiated by Dr. Tate SARMIENTO, the bed control nurse


Informed me that there are no beds available at this point, they would call back

when the bed is available, they have my phone number.


I informed Dr. Lovelace surgeon and Dr. Tate Dela Cruz  As well as the patient and her

nurse Ms. Pugh

## 2021-04-25 RX ADMIN — ONDANSETRON PRN MG: 2 INJECTION INTRAMUSCULAR; INTRAVENOUS at 06:11

## 2021-04-25 RX ADMIN — PIPERACILLIN AND TAZOBACTAM SCH: 4; .5 INJECTION, POWDER, FOR SOLUTION INTRAVENOUS at 22:44

## 2021-04-25 RX ADMIN — PIPERACILLIN AND TAZOBACTAM SCH: 4; .5 INJECTION, POWDER, FOR SOLUTION INTRAVENOUS at 20:32

## 2021-04-25 RX ADMIN — Medication SCH ML: at 22:45

## 2021-04-25 RX ADMIN — Medication SCH: at 20:28

## 2021-04-25 RX ADMIN — FAMOTIDINE SCH MG: 10 INJECTION, SOLUTION INTRAVENOUS at 22:38

## 2021-04-25 RX ADMIN — PIPERACILLIN AND TAZOBACTAM SCH MLS/HR: 4; .5 INJECTION, POWDER, FOR SOLUTION INTRAVENOUS at 22:45

## 2021-04-25 RX ADMIN — HEPARIN SODIUM SCH UNIT: 5000 INJECTION, SOLUTION INTRAVENOUS; SUBCUTANEOUS at 22:38

## 2021-04-25 RX ADMIN — Medication SCH ML: at 11:05

## 2021-04-25 RX ADMIN — FAMOTIDINE SCH MG: 10 INJECTION, SOLUTION INTRAVENOUS at 11:03

## 2021-04-25 RX ADMIN — HEPARIN SODIUM SCH UNIT: 5000 INJECTION, SOLUTION INTRAVENOUS; SUBCUTANEOUS at 11:03

## 2021-04-25 RX ADMIN — LEVOTHYROXINE SODIUM SCH: 0.05 TABLET ORAL at 20:31

## 2021-04-25 RX ADMIN — PIPERACILLIN AND TAZOBACTAM SCH MLS/HR: 4; .5 INJECTION, POWDER, FOR SOLUTION INTRAVENOUS at 15:13

## 2021-04-25 NOTE — PROGRESS NOTE
Assessment and Plan


Assessment and plan: 


Have not received any call from [facility] with bed available till 

this morning





--Transverse colon mass/malignancy and


Rectal mass/malignancy


EGD with biopsies ,pending report


Dr. Tate SARMIENTO has discussed with Dr. Carter colorectal surgeon 


and initiated transfer process to [facility] 


I personally called the transfer center 4/24/2021, no beds available


waiting for callback from [facility]


Did not receive any calls till this morning


Surgeon and GI are aware





--Hypokalemia;


Current Visit: Yes   Status: Acute,


Replenish and monitor electrolytes





-- Acute gastritis


Current Visit: Yes   Status: Acute, 


Secondary to obstructing colon cancer


Patient is tolerating clear liquids





-- Colon wall thickening


Current Visit: Yes   Status: Acute   


Colonoscopy positive for transverse colon mass


Rectal mass/malignancy





-- Hyponatremia/resolved


Current Visit: Yes   Status: Acute   


Continue IV fluids and supportive care





--Moderate malnutrition


Current Visit: Yes   Status: Chronic   


Plan to address problem: 


Dietary supplements once she starts eating





-- DVT prophylaxis


Current Visit: Yes   Status: Acute   


Plan to address problem: 


On heparin and GI prophylaxis





Awaiting response from [facility]


To transfer the patient for further evaluation management by colorectal surgeon 

Dr. Carter.





Plan of care reviewed with the patient and her nurse 





4/25/2021; continue clear liquids, mild nausea Zofran/Phenergan as needed


Supportive care, awaiting callback from [facility] with bed 

availability

















History


Interval history: 


I have seen and examined the patient this morning


Patient feels slightly better very concerned about her transfer and possible 

surgery


Mild nausea no vomitings


Tolerating clear liquids


Vital signs noted








Hospitalist Physical





- Constitutional


Vitals: 


                                        











Temp Pulse Resp BP Pulse Ox


 


 97.6 F   60   18   169/79   95 


 


 04/25/21 07:20  04/25/21 07:20  04/25/21 07:20  04/25/21 07:20  04/25/21 07:20











General appearance: Present: no acute distress, well-nourished, other (Anxious)





- EENT


Eyes: Present: PERRL, EOM intact





- Neck


Neck: Present: supple, normal ROM





- Respiratory


Respiratory effort: normal


Respiratory: bilateral: diminished, negative: rales, rhonchi, wheezing





- Cardiovascular


Rhythm: regular


Heart Sounds: Present: S1 & S2





- Extremities


Extremities: no ischemia, No edema





- Abdominal


General gastrointestinal: soft, non-tender, distended (Mild)





- Integumentary


Integumentary: Present: clear, warm





- Psychiatric


Psychiatric: appropriate mood/affect, other





- Neurologic


Neurologic: moves all extremities (Anxious)





Results





- Labs


CBC & Chem 7: 


                                 04/23/21 05:15





                                 04/23/21 05:15


Labs: 


                             Laboratory Last Values











WBC  11.3 K/mm3 (4.5-11.0)  H  04/23/21  05:15    


 


RBC  4.95 M/mm3 (3.65-5.03)   04/23/21  05:15    


 


Hgb  16.1 gm/dl (10.1-14.3)  H  04/23/21  05:15    


 


Hct  46.9 % (30.3-42.9)  H  04/23/21  05:15    


 


MCV  95 fl (79-97)   04/23/21  05:15    


 


MCH  33 pg (28-32)  H  04/23/21  05:15    


 


MCHC  34 % (30-34)   04/23/21  05:15    


 


RDW  14.0 % (13.2-15.2)   04/23/21  05:15    


 


Plt Count  286 K/mm3 (140-440)   04/23/21  05:15    


 


Lymph % (Auto)  14.7 % (13.4-35.0)   04/23/21  05:15    


 


Mono % (Auto)  8.5 % (0.0-7.3)  H  04/23/21  05:15    


 


Eos % (Auto)  0.5 % (0.0-4.3)   04/23/21  05:15    


 


Baso % (Auto)  0.6 % (0.0-1.8)   04/23/21  05:15    


 


Lymph # (Auto)  1.6 K/mm3 (1.2-5.4)   04/23/21  05:15    


 


Mono # (Auto)  1.0 K/mm3 (0.0-0.8)  H  04/23/21  05:15    


 


Eos # (Auto)  0.1 K/mm3 (0.0-0.4)   04/23/21  05:15    


 


Baso # (Auto)  0.1 K/mm3 (0.0-0.1)   04/23/21  05:15    


 


Seg Neutrophils %  75.7 % (40.0-70.0)  H  04/23/21  05:15    


 


Seg Neutrophils #  8.5 K/mm3 (1.8-7.7)  H  04/23/21  05:15    


 


Sodium  142 mmol/L (137-145)  D 04/23/21  05:15    


 


Potassium  3.4 mmol/L (3.6-5.0)  L  04/23/21  05:15    


 


Chloride  108.0 mmol/L ()  H  04/23/21  05:15    


 


Carbon Dioxide  22 mmol/L (22-30)   04/23/21  05:15    


 


Anion Gap  15 mmol/L  04/23/21  05:15    


 


BUN  10 mg/dL (7-17)   04/23/21  05:15    


 


Creatinine  0.6 mg/dL (0.6-1.2)   04/23/21  05:15    


 


Estimated GFR  > 60 ml/min  04/23/21  05:15    


 


BUN/Creatinine Ratio  17 %  04/23/21  05:15    


 


Glucose  122 mg/dL ()  H  04/23/21  05:15    


 


Hemoglobin A1c  5.2 % (4-6)   04/22/21  07:09    


 


Lactic Acid  1.00 mmol/L (0.7-2.0)   04/21/21  16:30    


 


Calcium  8.5 mg/dL (8.4-10.2)   04/23/21  05:15    


 


Magnesium  2.00 mg/dL (1.7-2.3)   04/23/21  05:15    


 


Total Bilirubin  0.80 mg/dL (0.1-1.2)   04/22/21  07:09    


 


AST  11 units/L (5-40)   04/22/21  07:09    


 


ALT  11 units/L (7-56)   04/22/21  07:09    


 


Alkaline Phosphatase  54 units/L ()   04/22/21  07:09    


 


Total Protein  5.3 g/dL (6.3-8.2)  L  04/22/21  07:09    


 


Albumin  3.3 g/dL (3.9-5)  L  04/22/21  07:09    


 


Albumin/Globulin Ratio  1.7 %  04/22/21  07:09    


 


Lipase  11 units/L (13-60)  L  04/21/21  13:34    


 


Urine Color  Dolores  (Yellow)   04/22/21  00:10    


 


Urine Turbidity  Clear  (Clear)   04/22/21  00:10    


 


Urine pH  5.0  (5.0-7.0)   04/22/21  00:10    


 


Ur Specific Gravity  1.060  (1.003-1.030)  H  04/22/21  00:10    


 


Urine Protein  30 mg/dl mg/dL (Negative)   04/22/21  00:10    


 


Urine Glucose (UA)  Neg mg/dL (Negative)   04/22/21  00:10    


 


Urine Ketones  80 mg/dL (Negative)   04/22/21  00:10    


 


Urine Blood  Neg  (Negative)   04/22/21  00:10    


 


Urine Nitrite  Neg  (Negative)   04/22/21  00:10    


 


Urine Bilirubin  Neg  (Negative)   04/22/21  00:10    


 


Urine Urobilinogen  4.0 mg/dL (<2.0)   04/22/21  00:10    


 


Ur Leukocyte Esterase  Neg  (Negative)   04/22/21  00:10    


 


Urine WBC (Auto)  9.0 /HPF (0.0-6.0)  H  04/22/21  00:10    


 


Urine RBC (Auto)  3.0 /HPF (0.0-6.0)   04/22/21  00:10    


 


U Epithel Cells (Auto)  1.0 /HPF (0-13.0)   04/22/21  00:10    


 


Urine Mucus  Few /HPF  04/22/21  00:10    











Microbiology: 


Microbiology





04/22/21 00:10   Urine,Clean Catch   Urine Culture - Preliminary


                            NO GROWTH AFTER 24 HOURS








Sosa/IV: 


                                        





Voiding Method                   Toilet











Active Medications





- Current Medications


Current Medications: 














Generic Name Dose Route Start Last Admin





  Trade Name Freq  PRN Reason Stop Dose Admin


 


Acetaminophen  650 mg  04/21/21 21:53 





  Acetaminophen 325 Mg Tab  PO  





  Q4H PRN  





  Pain MILD(1-3)/Fever >100.5/HA  


 


Famotidine  20 mg  04/21/21 22:00  04/25/21 11:03





  Famotidine 20 Mg/2 Ml Inj  IV   20 mg





  BID DUSTY   Administration


 


Heparin Sodium (Porcine)  5,000 unit  04/21/21 22:00  04/25/21 11:03





  Heparin 5,000 Unit/1 Ml Vial  SUB-Q   5,000 unit





  Q12HR DUSTY   Administration


 


Hydromorphone HCl  0.5 mg  04/21/21 21:46 





  Hydromorphone 1 Mg/1 Ml Inj  IV  





  Q3H PRN  





  Pain , Severe (7-10)  


 


Piperacillin Sod/Tazobactam Sod  4.5 gm in 100 mls @ 200 mls/hr  04/22/21 06:00 

04/24/21 22:00





  Zosyn/Ns 4.5gm/100ml  IV   200 mls/hr





  Q8HR DUSTY   Administration





  Protocol  


 


Potassium Chloride/Dextrose/Sod Cl  20 meq in 1,000 mls @ 100 mls/hr  04/22/21 

23:45  04/24/21 18:34





  D5w/Ns W/Kcl 20meq  IV   100 mls/hr





  AS DIRECT DUSTY   Administration


 


Levothyroxine Sodium  50 mcg  04/23/21 06:00  04/24/21 09:01





  Levothyroxine 50 Mcg Tab  PO   Not Given





  DAILY@0600 DUSTY  


 


Morphine Sulfate  2 mg  04/21/21 21:46 





  Morphine 2 Mg/1 Ml Inj  IV  





  Q4H PRN  





  Pain, Moderate (4-6)  


 


Ondansetron HCl  4 mg  04/21/21 21:53  04/25/21 06:11





  Ondansetron 4 Mg/2 Ml Inj  IV   4 mg





  Q8H PRN   Administration





  Nausea And Vomiting  


 


Sodium Chloride  10 ml  04/21/21 22:00  04/25/21 11:05





  Sodium Chloride 0.9% 10 Ml Flush Syringe  IV   10 ml





  BID DUSTY   Administration


 


Sodium Chloride  10 ml  04/21/21 21:46 





  Sodium Chloride 0.9% 10 Ml Flush Syringe  IV  





  PRN PRN  





  LINE FLUSH

## 2021-04-26 VITALS — SYSTOLIC BLOOD PRESSURE: 113 MMHG | DIASTOLIC BLOOD PRESSURE: 80 MMHG

## 2021-04-26 LAB
BASOPHILS # (AUTO): 0.1 K/MM3 (ref 0–0.1)
BASOPHILS NFR BLD AUTO: 0.5 % (ref 0–1.8)
BUN SERPL-MCNC: 5 MG/DL (ref 7–17)
BUN/CREAT SERPL: 7 %
CALCIUM SERPL-MCNC: 8.7 MG/DL (ref 8.4–10.2)
EOSINOPHIL # BLD AUTO: 0.1 K/MM3 (ref 0–0.4)
EOSINOPHIL NFR BLD AUTO: 1.2 % (ref 0–4.3)
HCT VFR BLD CALC: 47.5 % (ref 30.3–42.9)
HEMOLYSIS INDEX: 12
HGB BLD-MCNC: 16 GM/DL (ref 10.1–14.3)
LYMPHOCYTES # BLD AUTO: 2.1 K/MM3 (ref 1.2–5.4)
LYMPHOCYTES NFR BLD AUTO: 19.2 % (ref 13.4–35)
MCHC RBC AUTO-ENTMCNC: 34 % (ref 30–34)
MCV RBC AUTO: 94 FL (ref 79–97)
MONOCYTES # (AUTO): 0.8 K/MM3 (ref 0–0.8)
MONOCYTES % (AUTO): 7.3 % (ref 0–7.3)
PLATELET # BLD: 323 K/MM3 (ref 140–440)
RBC # BLD AUTO: 5.03 M/MM3 (ref 3.65–5.03)

## 2021-04-26 RX ADMIN — PIPERACILLIN AND TAZOBACTAM SCH MLS/HR: 4; .5 INJECTION, POWDER, FOR SOLUTION INTRAVENOUS at 05:54

## 2021-04-26 RX ADMIN — HEPARIN SODIUM SCH UNIT: 5000 INJECTION, SOLUTION INTRAVENOUS; SUBCUTANEOUS at 10:00

## 2021-04-26 RX ADMIN — Medication SCH ML: at 09:44

## 2021-04-26 RX ADMIN — LEVOTHYROXINE SODIUM SCH: 0.05 TABLET ORAL at 05:54

## 2021-04-26 RX ADMIN — PIPERACILLIN AND TAZOBACTAM SCH MLS/HR: 4; .5 INJECTION, POWDER, FOR SOLUTION INTRAVENOUS at 14:57

## 2021-04-26 RX ADMIN — FAMOTIDINE SCH MG: 10 INJECTION, SOLUTION INTRAVENOUS at 09:44

## 2021-04-26 RX ADMIN — DEXTROSE, SODIUM CHLORIDE, AND POTASSIUM CHLORIDE SCH MLS/HR: 5; .9; .15 INJECTION INTRAVENOUS at 06:22

## 2021-04-26 NOTE — PROGRESS NOTE
Assessment and Plan


Assessment and plan: 


I have not received any call from [facility] with bed availability 

till this morning





--Transverse colon mass/malignancy and


Rectal mass/malignancy


EGD with biopsies ,pending report


Dr. Tate SARMIENTO has discussed with Dr. Carter colorectal surgeon 


and initiated transfer process to [facility] 


I personally called the transfer center 4/24/2021, no beds available


waiting for callback from [facility]


Did not receive any calls till this morning


Surgeon Dr. Lovelace and GI Dr. Ybarra are aware





--Hypokalemia;


Current Visit: Yes   Status: Acute,


Replenish and monitor electrolytes





-- Acute gastritis


Current Visit: Yes   Status: Acute, 


Secondary to obstructing colon cancer


Patient is tolerating clear liquids





-- Colon wall thickening


Current Visit: Yes   Status: Acute   


Colonoscopy positive for transverse colon mass


Rectal mass/malignancy





-- Hyponatremia/resolved


Current Visit: Yes   Status: Acute   


Continue IV fluids and supportive care





--Moderate malnutrition


Current Visit: Yes   Status: Chronic   


Plan to address problem: 


Due to underlying disease process


Supportive care





-- DVT prophylaxis


Current Visit: Yes   Status: Acute   


Plan to address problem: 


On heparin and GI prophylaxis





Awaiting response from [facility]


To transfer the patient for further evaluation management by colorectal surgeon 

Dr. Carter.





Plan of care reviewed with the patient and her nurse 





Brief history; patient with transverse colon and rectal cancer on colonoscopy 

status post biopsy


Evaluated by surgeon and GI, Dr. Tate SARMIENTO spoke with colorectal surgeon Dr. Carter who accepted the patient


However no beds available in [facility], they would call back when 

the bed is available for possible transfer





4/25/2021; continue clear liquids, mild nausea Zofran/Phenergan as needed


Supportive care, awaiting callback from [facility] with bed 

availability





4/26/2021; continue current management, still awaiting transfer to a different 

hospital for evaluation


And management by colorectal surgeon, follow surgeon and GI recommendations














History


Interval history: 


I have seen and examined the patient at the bedside this morning 


patient's chart and medications reviewed





Patient with transverse colon and rectal cancer awaiting transfer to [facility]


for further evaluation and management by colorectal surgeon Dr. Carter.


Patient complains of some nausea and abdominal pain


Vital signs noted








Hospitalist Physical





- Constitutional


Vitals: 


                                        











Temp Pulse Resp BP Pulse Ox


 


 97.9 F   63   18   169/93   97 


 


 04/26/21 12:07  04/26/21 14:59  04/26/21 12:07  04/26/21 14:59  04/26/21 12:07











General appearance: Present: no acute distress, well-nourished, other (Anxious)





- EENT


Eyes: Present: PERRL, EOM intact





- Neck


Neck: Present: supple, normal ROM





- Respiratory


Respiratory effort: normal


Respiratory: bilateral: diminished, negative: rales, rhonchi, wheezing





- Cardiovascular


Rhythm: regular


Heart Sounds: Present: S1 & S2





- Extremities


Extremities: no ischemia, No edema





- Abdominal


General gastrointestinal: soft, tender (Mild no guarding), non-distended (Mild 

distention)





- Integumentary


Integumentary: Present: clear, warm





- Psychiatric


Psychiatric: appropriate mood/affect, cooperative (Anxious)





- Neurologic


Neurologic: moves all extremities





Results





- Labs


CBC & Chem 7: 


                                 04/26/21 10:13





                                 04/26/21 10:13


Labs: 


                             Laboratory Last Values











WBC  10.8 K/mm3 (4.5-11.0)   04/26/21  10:13    


 


RBC  5.03 M/mm3 (3.65-5.03)   04/26/21  10:13    


 


Hgb  16.0 gm/dl (10.1-14.3)  H  04/26/21  10:13    


 


Hct  47.5 % (30.3-42.9)  H  04/26/21  10:13    


 


MCV  94 fl (79-97)   04/26/21  10:13    


 


MCH  32 pg (28-32)   04/26/21  10:13    


 


MCHC  34 % (30-34)   04/26/21  10:13    


 


RDW  13.8 % (13.2-15.2)   04/26/21  10:13    


 


Plt Count  323 K/mm3 (140-440)   04/26/21  10:13    


 


Lymph % (Auto)  19.2 % (13.4-35.0)   04/26/21  10:13    


 


Mono % (Auto)  7.3 % (0.0-7.3)   04/26/21  10:13    


 


Eos % (Auto)  1.2 % (0.0-4.3)   04/26/21  10:13    


 


Baso % (Auto)  0.5 % (0.0-1.8)   04/26/21  10:13    


 


Lymph # (Auto)  2.1 K/mm3 (1.2-5.4)   04/26/21  10:13    


 


Mono # (Auto)  0.8 K/mm3 (0.0-0.8)   04/26/21  10:13    


 


Eos # (Auto)  0.1 K/mm3 (0.0-0.4)   04/26/21  10:13    


 


Baso # (Auto)  0.1 K/mm3 (0.0-0.1)   04/26/21  10:13    


 


Seg Neutrophils %  71.8 % (40.0-70.0)  H  04/26/21  10:13    


 


Seg Neutrophils #  7.7 K/mm3 (1.8-7.7)   04/26/21  10:13    


 


Sodium  140 mmol/L (137-145)   04/26/21  10:13    


 


Potassium  3.4 mmol/L (3.6-5.0)  L  04/26/21  10:13    


 


Chloride  101.7 mmol/L ()   04/26/21  10:13    


 


Carbon Dioxide  28 mmol/L (22-30)   04/26/21  10:13    


 


Anion Gap  14 mmol/L  04/26/21  10:13    


 


BUN  5 mg/dL (7-17)  L  04/26/21  10:13    


 


Creatinine  0.7 mg/dL (0.6-1.2)   04/26/21  10:13    


 


Estimated GFR  > 60 ml/min  04/26/21  10:13    


 


BUN/Creatinine Ratio  7 %  04/26/21  10:13    


 


Glucose  109 mg/dL ()  H  04/26/21  10:13    


 


Hemoglobin A1c  5.2 % (4-6)   04/22/21  07:09    


 


Lactic Acid  1.00 mmol/L (0.7-2.0)   04/21/21  16:30    


 


Calcium  8.7 mg/dL (8.4-10.2)   04/26/21  10:13    


 


Magnesium  2.00 mg/dL (1.7-2.3)   04/23/21  05:15    


 


Total Bilirubin  0.80 mg/dL (0.1-1.2)   04/22/21  07:09    


 


AST  11 units/L (5-40)   04/22/21  07:09    


 


ALT  11 units/L (7-56)   04/22/21  07:09    


 


Alkaline Phosphatase  54 units/L ()   04/22/21  07:09    


 


Total Protein  5.3 g/dL (6.3-8.2)  L  04/22/21  07:09    


 


Albumin  3.3 g/dL (3.9-5)  L  04/22/21  07:09    


 


Albumin/Globulin Ratio  1.7 %  04/22/21  07:09    


 


Lipase  11 units/L (13-60)  L  04/21/21  13:34    


 


Urine Color  Dolores  (Yellow)   04/22/21  00:10    


 


Urine Turbidity  Clear  (Clear)   04/22/21  00:10    


 


Urine pH  5.0  (5.0-7.0)   04/22/21  00:10    


 


Ur Specific Gravity  1.060  (1.003-1.030)  H  04/22/21  00:10    


 


Urine Protein  30 mg/dl mg/dL (Negative)   04/22/21  00:10    


 


Urine Glucose (UA)  Neg mg/dL (Negative)   04/22/21  00:10    


 


Urine Ketones  80 mg/dL (Negative)   04/22/21  00:10    


 


Urine Blood  Neg  (Negative)   04/22/21  00:10    


 


Urine Nitrite  Neg  (Negative)   04/22/21  00:10    


 


Urine Bilirubin  Neg  (Negative)   04/22/21  00:10    


 


Urine Urobilinogen  4.0 mg/dL (<2.0)   04/22/21  00:10    


 


Ur Leukocyte Esterase  Neg  (Negative)   04/22/21  00:10    


 


Urine WBC (Auto)  9.0 /HPF (0.0-6.0)  H  04/22/21  00:10    


 


Urine RBC (Auto)  3.0 /HPF (0.0-6.0)   04/22/21  00:10    


 


U Epithel Cells (Auto)  1.0 /HPF (0-13.0)   04/22/21  00:10    


 


Urine Mucus  Few /HPF  04/22/21  00:10    











Microbiology: 


Microbiology





04/22/21 00:10   Urine,Clean Catch   Urine Culture - Final


                            NO GROWTH AFTER 48 HOURS








Sosa/IV: 


                                        





Voiding Method                   Toilet











Active Medications





- Current Medications


Current Medications: 














Generic Name Dose Route Start Last Admin





  Trade Name Freq  PRN Reason Stop Dose Admin


 


Acetaminophen  650 mg  04/21/21 21:53 





  Acetaminophen 325 Mg Tab  PO  





  Q4H PRN  





  Pain MILD(1-3)/Fever >100.5/HA  


 


Famotidine  20 mg  04/21/21 22:00  04/26/21 09:44





  Famotidine 20 Mg/2 Ml Inj  IV   20 mg





  BID DUSTY   Administration


 


Heparin Sodium (Porcine)  5,000 unit  04/21/21 22:00  04/26/21 10:00





  Heparin 5,000 Unit/1 Ml Vial  SUB-Q   5,000 unit





  Q12HR DUSTY   Administration


 


Hydralazine HCl  10 mg  04/26/21 09:30 





  Hydralazine 20 Mg/1 Ml Inj  IV  





  Q4H PRN  





  Hypertension  


 


Hydralazine HCl  25 mg  04/26/21 14:00  04/26/21 14:59





  Hydralazine 25 Mg Tab  PO   25 mg





  Q8HR DUSTY   Administration


 


Hydromorphone HCl  0.5 mg  04/21/21 21:46  04/25/21 10:00





  Hydromorphone 1 Mg/1 Ml Inj  IV   0.5 mg





  Q3H PRN   Administration





  Pain , Severe (7-10)  


 


Piperacillin Sod/Tazobactam Sod  4.5 gm in 100 mls @ 200 mls/hr  04/22/21 06:00 

04/26/21 14:57





  Zosyn/Ns 4.5gm/100ml  IV   200 mls/hr





  Q8HR DUSTY   Administration





  Protocol  


 


Potassium Chloride/Dextrose/Sod Cl  20 meq in 1,000 mls @ 100 mls/hr  04/22/21 

23:45  04/26/21 06:22





  D5w/Ns W/Kcl 20meq  IV   100 mls/hr





  AS DIRECT DUSTY   Administration


 


Levothyroxine Sodium  50 mcg  04/23/21 06:00  04/26/21 05:54





  Levothyroxine 50 Mcg Tab  PO   Not Given





  DAILY@0600 DUSTY  


 


Morphine Sulfate  2 mg  04/21/21 21:46 





  Morphine 2 Mg/1 Ml Inj  IV  





  Q4H PRN  





  Pain, Moderate (4-6)  


 


Ondansetron HCl  4 mg  04/21/21 21:53  04/25/21 06:11





  Ondansetron 4 Mg/2 Ml Inj  IV   4 mg





  Q8H PRN   Administration





  Nausea And Vomiting  


 


Sodium Chloride  10 ml  04/21/21 22:00  04/26/21 09:44





  Sodium Chloride 0.9% 10 Ml Flush Syringe  IV   10 ml





  BID DUSTY   Administration


 


Sodium Chloride  10 ml  04/21/21 21:46 





  Sodium Chloride 0.9% 10 Ml Flush Syringe  IV  





  PRN PRN  





  LINE FLUSH

## 2021-05-07 ENCOUNTER — TELEPHONE ENCOUNTER (OUTPATIENT)
Dept: URBAN - METROPOLITAN AREA CLINIC 118 | Facility: CLINIC | Age: 76
End: 2021-05-07